# Patient Record
Sex: MALE | Race: WHITE | Employment: OTHER | URBAN - METROPOLITAN AREA
[De-identification: names, ages, dates, MRNs, and addresses within clinical notes are randomized per-mention and may not be internally consistent; named-entity substitution may affect disease eponyms.]

---

## 2022-05-17 ENCOUNTER — HOSPITAL ENCOUNTER (OUTPATIENT)
Age: 37
Setting detail: OBSERVATION
Discharge: HOME | End: 2022-05-18
Attending: EMERGENCY MEDICINE | Admitting: SURGERY
Payer: MEDICAID

## 2022-05-17 ENCOUNTER — APPOINTMENT (OUTPATIENT)
Dept: CT IMAGING | Age: 37
End: 2022-05-17
Payer: MEDICAID

## 2022-05-17 DIAGNOSIS — K61.0 PERIANAL ABSCESS: Primary | ICD-10-CM

## 2022-05-17 LAB
ANION GAP SERPL CALCULATED.3IONS-SCNC: 14 MMOL/L (ref 3–16)
BASOPHILS ABSOLUTE: 0.1 K/UL (ref 0–0.2)
BASOPHILS RELATIVE PERCENT: 0.7 %
BUN BLDV-MCNC: 9 MG/DL (ref 7–20)
CALCIUM SERPL-MCNC: 9.3 MG/DL (ref 8.3–10.6)
CHLORIDE BLD-SCNC: 105 MMOL/L (ref 99–110)
CO2: 20 MMOL/L (ref 21–32)
CREAT SERPL-MCNC: 0.7 MG/DL (ref 0.9–1.3)
EOSINOPHILS ABSOLUTE: 1.7 K/UL (ref 0–0.6)
EOSINOPHILS RELATIVE PERCENT: 11.7 %
GFR AFRICAN AMERICAN: >60
GFR NON-AFRICAN AMERICAN: >60
GLUCOSE BLD-MCNC: 101 MG/DL (ref 70–99)
HCT VFR BLD CALC: 42.7 % (ref 40.5–52.5)
HEMOGLOBIN: 14.6 G/DL (ref 13.5–17.5)
LACTIC ACID: 0.8 MMOL/L (ref 0.4–2)
LYMPHOCYTES ABSOLUTE: 2.1 K/UL (ref 1–5.1)
LYMPHOCYTES RELATIVE PERCENT: 14.6 %
MCH RBC QN AUTO: 31.4 PG (ref 26–34)
MCHC RBC AUTO-ENTMCNC: 34.3 G/DL (ref 31–36)
MCV RBC AUTO: 91.7 FL (ref 80–100)
MONOCYTES ABSOLUTE: 1 K/UL (ref 0–1.3)
MONOCYTES RELATIVE PERCENT: 7.2 %
NEUTROPHILS ABSOLUTE: 9.6 K/UL (ref 1.7–7.7)
NEUTROPHILS RELATIVE PERCENT: 65.8 %
PDW BLD-RTO: 12.9 % (ref 12.4–15.4)
PLATELET # BLD: 226 K/UL (ref 135–450)
PMV BLD AUTO: 9.2 FL (ref 5–10.5)
POTASSIUM REFLEX MAGNESIUM: 4.5 MMOL/L (ref 3.5–5.1)
RBC # BLD: 4.65 M/UL (ref 4.2–5.9)
SODIUM BLD-SCNC: 139 MMOL/L (ref 136–145)
WBC # BLD: 14.6 K/UL (ref 4–11)

## 2022-05-17 PROCEDURE — 96365 THER/PROPH/DIAG IV INF INIT: CPT

## 2022-05-17 PROCEDURE — 87205 SMEAR GRAM STAIN: CPT

## 2022-05-17 PROCEDURE — 2580000003 HC RX 258: Performed by: SURGERY

## 2022-05-17 PROCEDURE — 83605 ASSAY OF LACTIC ACID: CPT

## 2022-05-17 PROCEDURE — 96372 THER/PROPH/DIAG INJ SC/IM: CPT

## 2022-05-17 PROCEDURE — 87070 CULTURE OTHR SPECIMN AEROBIC: CPT

## 2022-05-17 PROCEDURE — 87040 BLOOD CULTURE FOR BACTERIA: CPT

## 2022-05-17 PROCEDURE — 99219 PR INITIAL OBSERVATION CARE/DAY 50 MINUTES: CPT | Performed by: SURGERY

## 2022-05-17 PROCEDURE — 6360000002 HC RX W HCPCS: Performed by: SURGERY

## 2022-05-17 PROCEDURE — G0378 HOSPITAL OBSERVATION PER HR: HCPCS

## 2022-05-17 PROCEDURE — 96367 TX/PROPH/DG ADDL SEQ IV INF: CPT

## 2022-05-17 PROCEDURE — 85025 COMPLETE CBC W/AUTO DIFF WBC: CPT

## 2022-05-17 PROCEDURE — 99285 EMERGENCY DEPT VISIT HI MDM: CPT

## 2022-05-17 PROCEDURE — 2580000003 HC RX 258: Performed by: EMERGENCY MEDICINE

## 2022-05-17 PROCEDURE — 6360000004 HC RX CONTRAST MEDICATION: Performed by: EMERGENCY MEDICINE

## 2022-05-17 PROCEDURE — 80048 BASIC METABOLIC PNL TOTAL CA: CPT

## 2022-05-17 PROCEDURE — 74177 CT ABD & PELVIS W/CONTRAST: CPT

## 2022-05-17 PROCEDURE — 36415 COLL VENOUS BLD VENIPUNCTURE: CPT

## 2022-05-17 PROCEDURE — 87075 CULTR BACTERIA EXCEPT BLOOD: CPT

## 2022-05-17 PROCEDURE — 87186 SC STD MICRODIL/AGAR DIL: CPT

## 2022-05-17 PROCEDURE — 2500000003 HC RX 250 WO HCPCS: Performed by: SURGERY

## 2022-05-17 PROCEDURE — 96366 THER/PROPH/DIAG IV INF ADDON: CPT

## 2022-05-17 PROCEDURE — 87076 CULTURE ANAEROBE IDENT EACH: CPT

## 2022-05-17 PROCEDURE — 96368 THER/DIAG CONCURRENT INF: CPT

## 2022-05-17 PROCEDURE — 6360000002 HC RX W HCPCS: Performed by: EMERGENCY MEDICINE

## 2022-05-17 PROCEDURE — 87077 CULTURE AEROBIC IDENTIFY: CPT

## 2022-05-17 PROCEDURE — 96361 HYDRATE IV INFUSION ADD-ON: CPT

## 2022-05-17 PROCEDURE — 2500000003 HC RX 250 WO HCPCS: Performed by: PHYSICIAN ASSISTANT

## 2022-05-17 RX ORDER — MORPHINE SULFATE 2 MG/ML
2 INJECTION, SOLUTION INTRAMUSCULAR; INTRAVENOUS
Status: DISCONTINUED | OUTPATIENT
Start: 2022-05-17 | End: 2022-05-18 | Stop reason: HOSPADM

## 2022-05-17 RX ORDER — ONDANSETRON 2 MG/ML
4 INJECTION INTRAMUSCULAR; INTRAVENOUS EVERY 6 HOURS PRN
Status: DISCONTINUED | OUTPATIENT
Start: 2022-05-17 | End: 2022-05-18 | Stop reason: HOSPADM

## 2022-05-17 RX ORDER — OXYCODONE HYDROCHLORIDE 5 MG/1
5 TABLET ORAL EVERY 4 HOURS PRN
Status: DISCONTINUED | OUTPATIENT
Start: 2022-05-17 | End: 2022-05-18 | Stop reason: HOSPADM

## 2022-05-17 RX ORDER — POTASSIUM CHLORIDE 7.45 MG/ML
10 INJECTION INTRAVENOUS PRN
Status: DISCONTINUED | OUTPATIENT
Start: 2022-05-17 | End: 2022-05-18 | Stop reason: HOSPADM

## 2022-05-17 RX ORDER — METRONIDAZOLE 500 MG/100ML
500 INJECTION, SOLUTION INTRAVENOUS EVERY 8 HOURS
Status: DISCONTINUED | OUTPATIENT
Start: 2022-05-17 | End: 2022-05-18 | Stop reason: HOSPADM

## 2022-05-17 RX ORDER — OXYCODONE HYDROCHLORIDE 10 MG/1
10 TABLET ORAL EVERY 4 HOURS PRN
Status: DISCONTINUED | OUTPATIENT
Start: 2022-05-17 | End: 2022-05-18 | Stop reason: HOSPADM

## 2022-05-17 RX ORDER — MORPHINE SULFATE 4 MG/ML
4 INJECTION, SOLUTION INTRAMUSCULAR; INTRAVENOUS
Status: DISCONTINUED | OUTPATIENT
Start: 2022-05-17 | End: 2022-05-18 | Stop reason: HOSPADM

## 2022-05-17 RX ORDER — DEXTROSE, SODIUM CHLORIDE, AND POTASSIUM CHLORIDE 5; .45; .15 G/100ML; G/100ML; G/100ML
INJECTION INTRAVENOUS CONTINUOUS
Status: DISCONTINUED | OUTPATIENT
Start: 2022-05-17 | End: 2022-05-18 | Stop reason: HOSPADM

## 2022-05-17 RX ORDER — SODIUM CHLORIDE 0.9 % (FLUSH) 0.9 %
5-40 SYRINGE (ML) INJECTION PRN
Status: DISCONTINUED | OUTPATIENT
Start: 2022-05-17 | End: 2022-05-18 | Stop reason: HOSPADM

## 2022-05-17 RX ORDER — SODIUM CHLORIDE 9 MG/ML
INJECTION, SOLUTION INTRAVENOUS PRN
Status: DISCONTINUED | OUTPATIENT
Start: 2022-05-17 | End: 2022-05-18 | Stop reason: HOSPADM

## 2022-05-17 RX ORDER — ONDANSETRON 4 MG/1
4 TABLET, ORALLY DISINTEGRATING ORAL EVERY 8 HOURS PRN
Status: DISCONTINUED | OUTPATIENT
Start: 2022-05-17 | End: 2022-05-18 | Stop reason: HOSPADM

## 2022-05-17 RX ORDER — MAGNESIUM SULFATE IN WATER 40 MG/ML
2000 INJECTION, SOLUTION INTRAVENOUS PRN
Status: DISCONTINUED | OUTPATIENT
Start: 2022-05-17 | End: 2022-05-18 | Stop reason: HOSPADM

## 2022-05-17 RX ORDER — ACETAMINOPHEN 325 MG/1
650 TABLET ORAL EVERY 4 HOURS PRN
Status: DISCONTINUED | OUTPATIENT
Start: 2022-05-17 | End: 2022-05-18 | Stop reason: HOSPADM

## 2022-05-17 RX ORDER — 0.9 % SODIUM CHLORIDE 0.9 %
1000 INTRAVENOUS SOLUTION INTRAVENOUS ONCE
Status: COMPLETED | OUTPATIENT
Start: 2022-05-17 | End: 2022-05-17

## 2022-05-17 RX ORDER — CIPROFLOXACIN 2 MG/ML
400 INJECTION, SOLUTION INTRAVENOUS EVERY 12 HOURS
Status: DISCONTINUED | OUTPATIENT
Start: 2022-05-17 | End: 2022-05-18 | Stop reason: HOSPADM

## 2022-05-17 RX ORDER — ENOXAPARIN SODIUM 100 MG/ML
40 INJECTION SUBCUTANEOUS DAILY
Status: DISCONTINUED | OUTPATIENT
Start: 2022-05-17 | End: 2022-05-18 | Stop reason: HOSPADM

## 2022-05-17 RX ORDER — SODIUM CHLORIDE 0.9 % (FLUSH) 0.9 %
5-40 SYRINGE (ML) INJECTION EVERY 12 HOURS SCHEDULED
Status: DISCONTINUED | OUTPATIENT
Start: 2022-05-17 | End: 2022-05-18 | Stop reason: HOSPADM

## 2022-05-17 RX ORDER — LIDOCAINE HYDROCHLORIDE AND EPINEPHRINE 10; 10 MG/ML; UG/ML
20 INJECTION, SOLUTION INFILTRATION; PERINEURAL ONCE
Status: COMPLETED | OUTPATIENT
Start: 2022-05-17 | End: 2022-05-17

## 2022-05-17 RX ADMIN — IOPAMIDOL 100 ML: 755 INJECTION, SOLUTION INTRAVENOUS at 11:45

## 2022-05-17 RX ADMIN — LIDOCAINE HYDROCHLORIDE,EPINEPHRINE BITARTRATE 20 ML: 10; .01 INJECTION, SOLUTION INFILTRATION; PERINEURAL at 18:58

## 2022-05-17 RX ADMIN — SODIUM CHLORIDE 1000 ML: 9 INJECTION, SOLUTION INTRAVENOUS at 10:40

## 2022-05-17 RX ADMIN — CIPROFLOXACIN 400 MG: 2 INJECTION, SOLUTION INTRAVENOUS at 21:46

## 2022-05-17 RX ADMIN — Medication 10 ML: at 20:21

## 2022-05-17 RX ADMIN — POTASSIUM CHLORIDE, DEXTROSE MONOHYDRATE AND SODIUM CHLORIDE: 150; 5; 450 INJECTION, SOLUTION INTRAVENOUS at 20:19

## 2022-05-17 RX ADMIN — ENOXAPARIN SODIUM 40 MG: 100 INJECTION SUBCUTANEOUS at 20:20

## 2022-05-17 RX ADMIN — SODIUM CHLORIDE 3000 MG: 9 INJECTION, SOLUTION INTRAVENOUS at 13:23

## 2022-05-17 RX ADMIN — METRONIDAZOLE 500 MG: 500 INJECTION, SOLUTION INTRAVENOUS at 20:20

## 2022-05-17 ASSESSMENT — PAIN - FUNCTIONAL ASSESSMENT: PAIN_FUNCTIONAL_ASSESSMENT: ACTIVITIES ARE NOT PREVENTED

## 2022-05-17 ASSESSMENT — ENCOUNTER SYMPTOMS
COUGH: 0
CHEST TIGHTNESS: 0
ABDOMINAL PAIN: 0
NAUSEA: 0
SORE THROAT: 0
SHORTNESS OF BREATH: 0
EYES NEGATIVE: 1
DIARRHEA: 0
RECTAL PAIN: 1
VOMITING: 0

## 2022-05-17 ASSESSMENT — PAIN DESCRIPTION - DESCRIPTORS: DESCRIPTORS: ACHING;PRESSURE

## 2022-05-17 ASSESSMENT — PAIN SCALES - GENERAL
PAINLEVEL_OUTOF10: 0
PAINLEVEL_OUTOF10: 3
PAINLEVEL_OUTOF10: 0

## 2022-05-17 ASSESSMENT — PAIN DESCRIPTION - LOCATION: LOCATION: OTHER (COMMENT)

## 2022-05-17 ASSESSMENT — PAIN DESCRIPTION - ORIENTATION: ORIENTATION: RIGHT

## 2022-05-17 NOTE — ED NOTES
[id number] placed IV easily right antecubital.    As soon as IV placed, pt states \"I am feeling very hot\". Blankets removed. Diaphoretic. VS taken. Didn't pass out. HOB lowered. EMD updated and IV fluids of NS began wide open.      Primitivo Coelho RN  05/17/22 6664

## 2022-05-17 NOTE — ED NOTES
[id number] Explained new orders and placing IV/drawing labs. IV site started to infiltrate but labs drawn before this happened.   Removed intact     Calos Pritchett RN  05/17/22 8535

## 2022-05-17 NOTE — ED NOTES
Report called to [address] at [facility].     Pt now leaving on ambulance stretcher to go to Abdulaziz Pimentel, RN  05/17/22 [phone]

## 2022-05-17 NOTE — ED NOTES
1412-14 on hold for 14+ minutes to reach access center. No bed assigned yet at [address] sup being called by access center. [facility] is full. Once a pt is discharged , then pt will be assigned a room.    Once room is cleaned, then pt can be transported to Brittany Campos RN  05/17/22 4446

## 2022-05-17 NOTE — ED NOTES
Right peroneal/buttocks abscess intermittently for past 4 months-occasionally drains. became more painful (with some bleeding also noted) yesterday. smaller than golf ball sized abscess noted-currently not draining or  bleeding.   pain now at 6-7.  no OTC pain meds taken     Meli Vaughn RN  05/17/22 1100

## 2022-05-17 NOTE — ED NOTES
EMD already called [facility] center for consult to general surgery for admission to [facility]/possible surgery     Priti Morales RN  05/17/22 5203

## 2022-05-17 NOTE — ED PROVIDER NOTES
[id number] [facility]  [facility]      Pt Name: Richelle Sweeney  MRN: [id number]  [place] 1985  Date ofevaluation: 5/17/2022  Provider: Ninoska Garza MD    CHIEF COMPLAINT       Chief Complaint   Patient presents with    Abscess     Right peroneal/buttocks abscess intermittently for past 4 months-occasionally drains. became more painful (with some bleeding also noted) yesterday. smaller than golf ball sized abscess noted-currently not draining or  bleeding. pain now at 6-7.  no OTC pain meds taken         HISTORY OF PRESENT ILLNESS   (Location/Symptom, Timing/Onset,Context/Setting, Quality, Duration, Modifying Factors, Severity)  Note limiting factors. Richelle Sweeney is a 39 y.o. male  who  has no past medical history on file. who presents to the emergency department     58-year-old male who presents for right perineal buttock pain and swelling which has been present intermittently for the last 4 months gradual in onset now significantly worse for the last 24 hours with pain and some bleeding when he was wiping. No active drainage. Pain is significantly worse achy and throbbing in nature. Nonradiating. Worse with having a bowel movement. No other modifying factors. Not taking any other medications at home. No other associated symptoms. No fevers or chills. No history of recurrent infections or immunocompromise state. For further review of systems see below. Symptoms are moderate to severe. The history is provided by the patient. No  was used. NursingNotes were reviewed. REVIEW OF SYSTEMS    (2-9 systems for level 4, 10 or more for level 5)     Review of Systems   Constitutional: Negative. Negative for fatigue and fever. HENT: Negative for congestion and sore throat. Eyes: Negative. Respiratory: Negative for cough, chest tightness and shortness of breath. Cardiovascular: Negative for chest pain. Gastrointestinal: Positive for rectal pain. Negative for abdominal pain, diarrhea, nausea and vomiting. Genitourinary: Negative. Musculoskeletal: Negative. Skin: Negative. Cyst/Abscess   Neurological: Negative for dizziness, light-headedness and headaches. All other systems reviewed and are negative. Except as noted above the remainder of the review of systems was reviewed and negative. PAST MEDICAL HISTORY   History reviewed. No pertinent past medical history. SURGICALHISTORY     History reviewed. No pertinent surgical history. CURRENT MEDICATIONS       Previous Medications    No medications on file            Patient has no known allergies. FAMILY HISTORY     History reviewed. No pertinent family history. SOCIAL HISTORY       Social History     Socioeconomic History    Marital status:      Spouse name: None    Number of children: None    Years of education: None    Highest education level: None   Occupational History    None   Tobacco Use    Smoking status: Current Every Day Smoker     Packs/day: 0.50     Types: Cigarettes    Smokeless tobacco: Never Used   Substance and Sexual Activity    Alcohol use: Yes    Drug use: Yes     Comment: claims \"the good ones\"     Sexual activity: None   Other Topics Concern    None   Social History Narrative    None     Social Determinants of Health     Financial Resource Strain:     Difficulty of Paying Living Expenses: Not on file   Food Insecurity:     Worried About Running Out of Food in the Last Year: Not on file    Horace of Food in the Last Year: Not on file   Transportation Needs:     Lack of Transportation (Medical): Not on file    Lack of Transportation (Non-Medical):  Not on file   Physical Activity:     Days of Exercise per Week: Not on file    Minutes of Exercise per Session: Not on file   Stress:     Feeling of Stress : Not on file   Social Connections:     Frequency of Communication with Friends and Family: Not on file    Frequency of Social Gatherings with Friends and Family: Not on file    Attends Episcopal Services: Not on file    Active Member of Clubs or Organizations: Not on file    Attends Club or Organization Meetings: Not on file    Marital Status: Not on file   Intimate Partner Violence:     Fear of Current or Ex-Partner: Not on file    Emotionally Abused: Not on file    Physically Abused: Not on file    Sexually Abused: Not on file   Housing Stability:     Unable to Pay for Housing in the Last Year: Not on file    Number of [place] in the Last Year: Not on file    Unstable Housing in the Last Year: Not on file       SCREENINGS    [place] Coma Scale  Eye Opening: Spontaneous  Best Verbal Response: Oriented  Best Motor Response: Obeys commands  [place] Coma Scale Score: 15        PHYSICAL EXAM    (up to 7 for level 4, 8 or more for level 5)     ED Triage Vitals   BP Temp Temp src Pulse Resp SpO2 Height Weight   -- -- -- -- -- -- -- --       Physical Exam  Vitals and nursing note reviewed. Exam conducted with a chaperone present. Constitutional:       General: He is not in acute distress. Appearance: Normal appearance. He is well-developed and normal weight. He is not ill-appearing, toxic-appearing or diaphoretic. HENT:      Head: Normocephalic and atraumatic. Mouth/Throat:      Mouth: Mucous membranes are moist.      Pharynx: Oropharynx is clear. Eyes:      Extraocular Movements: Extraocular movements intact. Cardiovascular:      Rate and Rhythm: Normal rate and regular rhythm. Pulses: Normal pulses. Pulmonary:      Effort: Pulmonary effort is normal.      Breath sounds: Normal breath sounds. No decreased breath sounds. Abdominal:      Palpations: Abdomen is soft. Tenderness: There is no abdominal tenderness. Genitourinary:     Rectum: Tenderness present.           Comments: Significant rectal tenderness with large swelling and surrounding erythema on the right anal opening with some surrounding confluent erythema  Musculoskeletal:      Cervical back: Normal range of motion and neck supple. Skin:     General: Skin is warm and dry. Capillary Refill: Capillary refill takes less than 2 seconds. Neurological:      General: No focal deficit present. Mental Status: He is alert. Psychiatric:         Mood and Affect: Mood normal.         RESULTS         RADIOLOGY:   Non-plain filmimages such as CT, Ultrasound and MRI are read by the radiologist.   Interpretation per the Radiologist below, if available at the time ofthis note:    CT ABDOMEN PELVIS W IV CONTRAST Additional Contrast? None   Final Result   Findings worrisome for a mass in segment 6 of the liver. MRI of the liver   using liver mass protocol is recommended for further evaluation. Perianal abscess measuring 3.0 x 2.7 x 2.4 cm in size. Question of a filling defect in the proximal portion of the portal vein on   images 57-63 versus inflow of unopacified blood. Vascular ultrasound is   recommended for further evaluation. ED BEDSIDE ULTRASOUND:   Performed by ED Physician - none    LABS:  Labs Reviewed   CBC WITH AUTO DIFFERENTIAL - Abnormal; Notable for the following components:       Result Value    WBC 14.6 (*)     Neutrophils Absolute 9.6 (*)     Eosinophils Absolute 1.7 (*)     All other components within normal limits   BASIC METABOLIC PANEL W/ REFLEX TO MG FOR LOW K - Abnormal; Notable for the following components:    CO2 20 (*)     Glucose 101 (*)     CREATININE 0.7 (*)     All other components within normal limits   CULTURE, BLOOD 1   CULTURE, BLOOD 2   LACTIC ACID       All other labs were within normal range or not returned as of this dictation.     EMERGENCY DEPARTMENT COURSE and DIFFERENTIAL DIAGNOSIS/MDM:   Vitals:    Vitals:    05/17/22 1100 05/17/22 1115 05/17/22 1130 05/17/22 1202   BP: 128/71 119/77 121/74 127/74   Pulse: 56 57 60 55   Resp: 18 13 22 20   Temp:       TempSrc:       SpO2: 100% 100% 100% 100%   Weight:       Height:           Patient was given thefollowing medications:  Medications   ampicillin-sulbactam (UNASYN) 3000 mg ivpb minibag (has no administration in time range)   0.9 % sodium chloride bolus (0 mLs IntraVENous Stopped 5/17/22 1205)   iopamidol (ISOVUE-370) 76 % injection 100 mL (100 mLs IntraVENous Given 5/17/22 1145)       ED COURSE & MEDICAL DECISION MAKING    Pertinent Labs & Imaging studies reviewed. (See chart for details)   -  Patient seen and evaluated in the emergency department. -  Triage and nursing notes reviewed and incorporated. -  Old chart records reviewed and incorporated. -  Differential diagnosis includes: Differential diagnosis: Perineal abscess, perianal abscess, interest enteric abscess, necrotizing fasciitis, deep space soft tissue bacterial skin infection, viral rash, systemic infectious rash, aseptic cyst, malignancy, other    25-year-old male presents for likely right perineal perirectal abscess with significant tenderness on his rectal exam no active bleeding no active drainage. Significant fluctuance and induration. Concern for deep space infection is high. Patient is afebrile not tachycardic saturating well on room air normotensive. Will obtain broad infectious work-up as well as CT scan of abdomen and pelvis with contrast to look for deep space infection we will also give patient dose of Unasyn as well as order lactate and cultures. Will reeval closely and likely consult general surgery for possible OR drainage.    -  Work-up included:  See above  -  ED treatment included: See above  -  Results discussed with patient. REASSESSMENT     ED Course as of 05/17/22 1319   Tue May 17, 2022   1208 CT scan with significant perianal and perirectal abscess. Elevated WBC.  Will give IV abx and consult General Surgery for admission and possible OR drainage. [SC]   1318 Accepted for transfer by  Shan [SC]      ED Course User Index  [SC] Dustin Rashid MD         CRITICAL CARE TIME   Total Critical Care time was 21 minutes, excluding separately reportable procedures. There was a high probability of clinically significant/life threatening deterioration in the patient's condition which required my urgent intervention. This includes multiple reevaluations, vital sign monitoring, pulse oximetry monitoring, telemetry monitoring, clinical response to the IV medications, reviewing the nursing notes, consultation time, dictation/documentation time, and interpretation of the labwork. (This time excludes time spent performing procedures). CONSULTS:  IP CONSULT TO GENERAL SURGERY    PROCEDURES:  Unless otherwise noted below, none     Procedures    FINAL IMPRESSION      1. Perianal abscess          DISPOSITION/PLAN   DISPOSITION        PATIENT REFERREDTO:  No follow-up provider specified.     DISCHARGEMEDICATIONS:  New Prescriptions    No medications on file          (Please note that portions of this note were completed with a voice recognition program.  Efforts were made to edit the dictations but occasionally words are mis-transcribed.)    Dustin Rashid MD (electronically signed)  Attending Emergency Physician         Dustin Rashid MD  05/17/22 9141

## 2022-05-17 NOTE — ED NOTES
Pt assigned room 4252 on tracking board-room being cleaned. Pt updated.           Leoncio Moore RN  05/17/22 9991

## 2022-05-17 NOTE — ED NOTES
Access center called. .   Pt will be admitted to room 4252 at [address].   Phone number for report is [phone]. [facility] will transport at 1730. Pt updated. No pain.   Dad at bedside and updated (with pt permission)     Cornland Metro, RN  05/17/22 327 [facility], RN  05/17/22 2775

## 2022-05-17 NOTE — PROGRESS NOTES
Call light and personal belongings within reach, room free of clutter. Pt with no s/s of distress or SOB observed. Pt in facility via stretcher able to get up and ambulate to bed. PA in to do opening of perirectal abscess. Procedure was tolerated well. Dry dressing applied per MD. Pt declined dinner. Is alert and oriented x4 and able to make needs known. No c/o pain at this time.

## 2022-05-17 NOTE — ED NOTES
[phone] report to [facility] crew who will be transporting pt to [address] [place] [address],[address].  Pain to tab rectal area at 2. Remains in sinus rhythm. Belongings with pt. Dad still at bedside. To [facility] on ambulance stretcher.         Jeffrey Arita, FREDO  05/17/22 1300

## 2022-05-18 VITALS
SYSTOLIC BLOOD PRESSURE: 110 MMHG | BODY MASS INDEX: 20.94 KG/M2 | HEART RATE: 61 BPM | RESPIRATION RATE: 18 BRPM | TEMPERATURE: 97.9 F | HEIGHT: 66 IN | WEIGHT: 130.29 LBS | OXYGEN SATURATION: 95 % | DIASTOLIC BLOOD PRESSURE: 57 MMHG

## 2022-05-18 LAB
ANION GAP SERPL CALCULATED.3IONS-SCNC: 10 MMOL/L (ref 3–16)
BASOPHILS ABSOLUTE: 0.1 K/UL (ref 0–0.2)
BASOPHILS RELATIVE PERCENT: 0.6 %
BUN BLDV-MCNC: 8 MG/DL (ref 7–20)
CALCIUM SERPL-MCNC: 9 MG/DL (ref 8.3–10.6)
CHLORIDE BLD-SCNC: 106 MMOL/L (ref 99–110)
CO2: 23 MMOL/L (ref 21–32)
CREAT SERPL-MCNC: 0.7 MG/DL (ref 0.9–1.3)
EOSINOPHILS ABSOLUTE: 1.7 K/UL (ref 0–0.6)
EOSINOPHILS RELATIVE PERCENT: 17.9 %
GFR AFRICAN AMERICAN: >60
GFR NON-AFRICAN AMERICAN: >60
GLUCOSE BLD-MCNC: 118 MG/DL (ref 70–99)
HCT VFR BLD CALC: 43 % (ref 40.5–52.5)
HEMOGLOBIN: 14.7 G/DL (ref 13.5–17.5)
LYMPHOCYTES ABSOLUTE: 2.2 K/UL (ref 1–5.1)
LYMPHOCYTES RELATIVE PERCENT: 22.8 %
MAGNESIUM: 2.1 MG/DL (ref 1.8–2.4)
MCH RBC QN AUTO: 31.6 PG (ref 26–34)
MCHC RBC AUTO-ENTMCNC: 34.2 G/DL (ref 31–36)
MCV RBC AUTO: 92.3 FL (ref 80–100)
MONOCYTES ABSOLUTE: 0.9 K/UL (ref 0–1.3)
MONOCYTES RELATIVE PERCENT: 8.9 %
NEUTROPHILS ABSOLUTE: 4.8 K/UL (ref 1.7–7.7)
NEUTROPHILS RELATIVE PERCENT: 49.8 %
PDW BLD-RTO: 12.9 % (ref 12.4–15.4)
PHOSPHORUS: 3.2 MG/DL (ref 2.5–4.9)
PLATELET # BLD: 214 K/UL (ref 135–450)
PMV BLD AUTO: 8.8 FL (ref 5–10.5)
POTASSIUM REFLEX MAGNESIUM: 4.6 MMOL/L (ref 3.5–5.1)
RBC # BLD: 4.65 M/UL (ref 4.2–5.9)
SODIUM BLD-SCNC: 139 MMOL/L (ref 136–145)
WBC # BLD: 9.6 K/UL (ref 4–11)

## 2022-05-18 PROCEDURE — 46050 I&D PERIANAL ABSCESS SUPFC: CPT | Performed by: PHYSICIAN ASSISTANT

## 2022-05-18 PROCEDURE — G0378 HOSPITAL OBSERVATION PER HR: HCPCS

## 2022-05-18 PROCEDURE — 2500000003 HC RX 250 WO HCPCS: Performed by: SURGERY

## 2022-05-18 PROCEDURE — 84100 ASSAY OF PHOSPHORUS: CPT

## 2022-05-18 PROCEDURE — 99024 POSTOP FOLLOW-UP VISIT: CPT | Performed by: PHYSICIAN ASSISTANT

## 2022-05-18 PROCEDURE — 36415 COLL VENOUS BLD VENIPUNCTURE: CPT

## 2022-05-18 PROCEDURE — 96366 THER/PROPH/DIAG IV INF ADDON: CPT

## 2022-05-18 PROCEDURE — 85025 COMPLETE CBC W/AUTO DIFF WBC: CPT

## 2022-05-18 PROCEDURE — 83735 ASSAY OF MAGNESIUM: CPT

## 2022-05-18 PROCEDURE — 80048 BASIC METABOLIC PNL TOTAL CA: CPT

## 2022-05-18 RX ORDER — CIPROFLOXACIN 500 MG/1
500 TABLET, FILM COATED ORAL 2 TIMES DAILY
Qty: 20 TABLET | Refills: 0 | Status: SHIPPED | OUTPATIENT
Start: 2022-05-18 | End: 2022-05-28

## 2022-05-18 RX ADMIN — METRONIDAZOLE 500 MG: 500 INJECTION, SOLUTION INTRAVENOUS at 03:39

## 2022-05-18 NOTE — PROGRESS NOTES
Packing removed per [facility]I Wilson order. Pt educated on d/c instructions, abx and follow up appointment.  Electronically signed by Gaston Wallis RN on 5/18/2022 at 10:05 AM

## 2022-05-18 NOTE — PROGRESS NOTES
Pt educated on discharge instructions and follow-up appointments. Pt states no further questions at this time. Pt IV removed with no complications. Pt ambulated to ED lobby, transporting self home.     Electronically signed by Gerardo Ramirez RN on 5/18/22 at 8:07 AM EDT

## 2022-05-18 NOTE — H&P
General Surgery History & Physical      Faby Marcelino   : 1985 MRN: [id number]  Date of Admission: 2022  Admitting Ang Peraza MD  Primary Care Physician: No primary care provider on file. Chief Complaint   Patient presents with    Abscess     Right peroneal/buttocks abscess intermittently for past 4 months-occasionally drains. became more painful (with some bleeding also noted) yesterday. smaller than golf ball sized abscess noted-currently not draining or  bleeding. pain now at 6-7.  no OTC pain meds taken       Chief Complaint/Reason for consultation: \"perianal abscess\"    Diagnosis Present on Admission:   Perianal abscess      Secondary Diagnosis  Patient Active Problem List   Diagnosis    Perianal abscess       History of Present Illness  Faby Marcelino is a 39 y.o. male who presented to the ED today for a perianal abscess. He first noticed swelling and possible cyst located on the right buttock/perianal area over the past few months. Typically he has bee able to express the fluid collection manually. However this time, the swelling worsened and went deeper. No fevers or chills. He did have some blood in his stool yesterday. Never had anything like this before. History reviewed. No pertinent past medical history. History reviewed. No pertinent surgical history. Family history reviewed and otherwise negative. History reviewed. No pertinent family history. Social History     Socioeconomic History    Marital status:      Spouse name: Not on file    Number of children: Not on file    Years of education: Not on file    Highest education level: Not on file   Occupational History    Not on file   Tobacco Use    Smoking status: Current Every Day Smoker     Packs/day: 0.50     Types: Cigarettes    Smokeless tobacco: Never Used   Substance and Sexual Activity    Alcohol use:  Yes    Drug use: Yes     Comment: claims \"the good ones\"     Sexual activity: Not on file   Other Topics Concern    Not on file   Social History Narrative    Not on file     Social Determinants of Health     Financial Resource Strain:     Difficulty of Paying Living Expenses: Not on file   Food Insecurity:     Worried About Running Out of Food in the Last Year: Not on file    Horace of Food in the Last Year: Not on file   Transportation Needs:     Lack of Transportation (Medical): Not on file    Lack of Transportation (Non-Medical): Not on file   Physical Activity:     Days of Exercise per Week: Not on file    Minutes of Exercise per Session: Not on file   Stress:     Feeling of Stress : Not on file   Social Connections:     Frequency of Communication with Friends and Family: Not on file    Frequency of Social Gatherings with Friends and Family: Not on file    Attends Yarsanism Services: Not on file    Active Member of [address] [place] [address] [employer] or Organizations: Not on file    Attends Club or Organization Meetings: Not on file    Marital Status: Not on file   Intimate Partner Violence:     Fear of Current or Ex-Partner: Not on file    Emotionally Abused: Not on file    Physically Abused: Not on file    Sexually Abused: Not on file   Housing Stability:     Unable to Pay for Housing in the Last Year: Not on file    Number of [place] in the Last Year: Not on file    Unstable Housing in the Last Year: Not on file     No Known Allergies  Prior to Admission medications    Not on File       Review of Systems  Pertinent positives and negatives are in HPI, otherwise all systems reviewed and negative    Physical Exam  Vitals:    05/17/22 2009   BP: 112/72   Pulse: 76   Resp: 18   Temp: 98 °F (36.7 °C)   SpO2: 97%       General/Appearance: NAD, appears stated age  HEENT: PERRLA, Conjunctiva non injected, no scleral icterus. Mucous membranes pink and moist.  Neck is symmetrical. Trachea appears midline.   Lung: normal respiratory effort, no accessory muscle use  Cardiac: Regular rate and rhythm  Abdomen: soft, NT, ND  Neuro: No gross motor or sensory deficits. Skin: right perianal area with wound with packing, no bleeding or drainage, minimal tenderness and induration surrouning    Labs  Recent Labs     05/17/22  1030   WBC 14.6*   HGB 14.6   HCT 42.7        Recent Labs     05/17/22  1030      K 4.5      CO2 20*   BUN 9   GFRAA >60     No results for input(s): TP, ALB, GLOB, AST, ALT in the last 72 hours. Invalid input(s): DBIL, TBIL, AGRAT, GPT, MOUNIKA, LIP  No results for input(s): UOSM in the last 72 hours. Invalid input(s): UAPR, [facility], [facility], [place], [facility], [place], [place], [facility]    Imaging  CT ABDOMEN PELVIS W IV CONTRAST Additional Contrast? None   Final Result   Findings worrisome for a mass in segment 6 of the liver. MRI of the liver   using liver mass protocol is recommended for further evaluation. Perianal abscess measuring 3.0 x 2.7 x 2.4 cm in size. Question of a filling defect in the proximal portion of the portal vein on   images 57-63 versus inflow of unopacified blood. Vascular ultrasound is   recommended for further evaluation. CT abd/pelvis personally reviewed, results as above    Assessment  Aram Hatchet is a 39 y.o. male admitted for perianal abscess    Plan    1. Perianal abscess  · CT abd/pelvis shows 2.4 x 2.7 x 3 cm perianal abscess  · I&D performed at bedside by Benito Rodriguez PA-C. Please see his note for further details  · Continue cipro/flagyl  · Ok for regular diet, NPO after midnight in case he clinically worsens and needs OR drainage/debridement  · If wound improving, will remove packing and discharge home tomorrow  2.  Incidental right liver mass/cyst/abscess  · Will discuss with radiology next best imaging study given liver masses and questionable filling defect of portal vein  · Plan to obtain imaging as an outpatient due to childcare issues requiring attention at home      Carlotta Delgado MD

## 2022-05-18 NOTE — PROCEDURES
Procedure Note: Incision and Drainage of Perirectal Abscess    Indication: Indurated and erythematous area located in the perirectal region which is fluctuant with probable pus    Time Out: A time-out was completed verifying consent was obtained, correct patient, procedure, site, positioning, and correct supplies were present. Procedure: Patient was positioned, prepped in usual sterile fashion. Approximately 5 ml's of 1% Lidocaine was used to anesthetize the area. A number 15 blade scalpel was used to make an approximately 3 cm incision horizontally across the body of the abscess. A forcep and manual pressure was used to remove the contents of the abscess cavity, and a culture was obtained    Following evacuation of the abscess cavity, it was rinsed with sterile normal saline. Finally, the abscess cavity was packed with 1/4 inch iodoform packing and dressed with 4x4 guaze and ABD pad. EBL: < 5 ml    The patient tolerated the procedure well.  No complications    Electronically signed by Muriel Peralta PA-C on 5/18/2022 at 1:35 PM

## 2022-05-18 NOTE — PROGRESS NOTES
Pt awake and walking around room, stating he wants to leave stating personal issues. This nurse called general surgery MAI Alvarez and discussed pt status. Pt prescribed PO antibiotics for discharge, surgical site packing removed per order, discharge order placed. Pt given all instructions on keeping incision clean, dry and taking antibiotics as prescribed. Pt advised on assessing for signs of infection as well.

## 2022-05-18 NOTE — CARE COORDINATION
Discharge order noted, however, patient has already left the building. Respectfully submitted,    HAILY Wu  [facility] [address]   [phone]    Electronically signed by HAILY Leigh on 5/18/2022 at 9:07 AM

## 2022-05-18 NOTE — PLAN OF CARE
Problem: Pain  Goal: Verbalizes/displays adequate comfort level or baseline comfort level  5/18/2022 0027 by Floresita Daniels RN  Outcome: Progressing  5/17/2022 1909 by Nia Adame RN  Outcome: Progressing  Flowsheets (Taken 5/17/2022 1842)  Verbalizes/displays adequate comfort level or baseline comfort level:   Encourage patient to monitor pain and request assistance   Assess pain using appropriate pain scale   Administer analgesics based on type and severity of pain and evaluate response     Problem: ABCDS Injury Assessment  Goal: Absence of physical injury  5/18/2022 0027 by Floresita Daniels RN  Outcome: Progressing  5/17/2022 1909 by Nia Adame RN  Outcome: Progressing     Problem: Skin/Tissue Integrity - Adult  Goal: Incisions, wounds, or drain sites healing without S/S of infection  Outcome: Progressing

## 2022-05-21 LAB
ANAEROBIC CULTURE: ABNORMAL
BLOOD CULTURE, ROUTINE: NORMAL
CULTURE, BLOOD 2: NORMAL
GRAM STAIN RESULT: ABNORMAL
ORGANISM: ABNORMAL
ORGANISM: ABNORMAL
WOUND/ABSCESS: ABNORMAL

## 2022-06-02 ENCOUNTER — OFFICE VISIT (OUTPATIENT)
Dept: SURGERY | Age: 37
End: 2022-06-02

## 2022-06-02 VITALS
DIASTOLIC BLOOD PRESSURE: 79 MMHG | WEIGHT: 132 LBS | BODY MASS INDEX: 21.31 KG/M2 | HEART RATE: 91 BPM | SYSTOLIC BLOOD PRESSURE: 134 MMHG

## 2022-06-02 DIAGNOSIS — K61.0 PERIANAL ABSCESS: Primary | ICD-10-CM

## 2022-06-02 DIAGNOSIS — K76.89 LIVER NODULE: ICD-10-CM

## 2022-06-02 PROCEDURE — 99024 POSTOP FOLLOW-UP VISIT: CPT | Performed by: SURGERY

## 2022-06-02 RX ORDER — SULFAMETHOXAZOLE AND TRIMETHOPRIM 800; 160 MG/1; MG/1
1 TABLET ORAL 2 TIMES DAILY
Qty: 20 TABLET | Refills: 0 | Status: SHIPPED | OUTPATIENT
Start: 2022-06-02 | End: 2022-06-12

## 2022-06-02 NOTE — PROGRESS NOTES
Post Operative Visit    [address]  [facility]   Stephan Patel MD    [address], [address]  [place]  Phone: [phone]  Fax: [phone]    Cristy Beavers   YOB: 1985    Date of Visit:  6/2/2022    No ref. provider found  No primary care provider on file. Subjective:     Cristy Beavers presents for a two week follow-up s/p I&D of right perianal abscess. Overall he has been doing better since his discharge. His pain has resolved. He is still having some drainage from the area but he thinks it is separate from the I&D site. He still has a non-tender nodule present. He denies any fevers or chills. No Known Allergies  Outpatient Medications Marked as Taking for the 6/2/22 encounter (Office Visit) with Jan Cesar MD   Medication Sig Dispense Refill    sulfamethoxazole-trimethoprim (BACTRIM DS;SEPTRA DS) 800-160 MG per tablet Take 1 tablet by mouth 2 times daily for 10 days 20 tablet 0       Vitals:    06/02/22 1347   BP: 134/79   Pulse: 91   Weight: 132 lb (59.9 kg)     Body mass index is 21.31 kg/m².      Wt Readings from Last 3 Encounters:   06/02/22 132 lb (59.9 kg)   05/18/22 130 lb 4.7 oz (59.1 kg)   11/04/17 197 lb 5 oz (89.5 kg)     BP Readings from Last 3 Encounters:   06/02/22 134/79   05/18/22 (!) 110/57   11/04/17 (!) 111/51          Objective:    CONSTITUTIONAL:  awake, alert, no apparent distress    LUNGS:  Resp easy and unlabored  MUSCULOSKELETAL: No edema  NEUROLOGIC:  Mental Status Exam:  Level of Alertness:   awake  Orientation:   person, place, time  SKIN: right perianal area with 1.5 cm cutaneous nodule consistent with cyst vs less likely fistula tract, no induration      Microbiology:  Gram Stain Result  Abnormal   1+ Gram positive cocci   1+ WBC's (Polymorphonuclear)   No Epithelial Cells seen     Organism Staphylococcus warneri Abnormal     WOUND/ABSCESS Isolated from Broth    Organism Peptostreptococcus anaerobius Abnormal     Anaerobic Culture Moderate growth   No further workup          ASSESSMENT:     Diagnosis Orders   1. Perianal abscess  sulfamethoxazole-trimethoprim (BACTRIM DS;SEPTRA DS) 800-160 MG per tablet   2. Liver nodule  MRI ABDOMEN W WO CONTRAST       PLAN:    Gray Epstein is doing well. His drainage has slowed down from his perianal abscess but he still has a residual nodule. Difficult to determine if this is a residual cyst vs fistula. Will give 10 days of bactrim and re-evaluate it in 2-3 weeks. If it is persistent, will schedule for rectal exam under anesthesia with excision of fistula vs perianal cyst.  For his liver nodule, will obtain an MRI with liver protocol. He does have a history of EtOH abuse but has been sober x 14 months. We will discuss imaging results with his next appointment.     Electronically signed by John Victor MD on 6/2/2022 at 2:10 PM

## 2022-06-07 NOTE — DISCHARGE SUMMARY
General Surgery Discharge Summary        Joel Bryan   : 1985 MRN: [id number]  Date of Admission: 2022  Date of Discharge: 22  Admitting Jaspal Santoyo MD  Primary Care Physician: No primary care provider on file. Summary by: Alf Merida PA-C    Diagnosis Present on Admission:   Perianal abscess    Secondary diagnosis:   Patient Active Problem List   Diagnosis    Perianal abscess     Summary of hospital stay:   Joel Bryan is a 39 y.o. male admitted for a perianal abscess. He first noticed swelling and possible cyst located on the right buttock/perianal area over the past few months. Typically he has been able to express the fluid collection manually. However this time, the swelling worsened and went deeper. On examination the area had induration and was erythematous with fluctuance. At that time we discussed the need to have the abscess opened. He was given all the risk and benefits of the procedure and agreed to proceed. He tolerated the incision and drainage of the perirectal abscess well. HE was given IV antibiotics and monitored. The next day he had significant improvement. No purulent drainage was noted. Pain was minimal, and he was able to eat and ambulate without any difficulty. He did well throughout his hospital stay and was discharged home in stable condition. Discharge Medications:  Discharge Medication List as of 2022  7:57 AM      START taking these medications    Details   ciprofloxacin (CIPRO) 500 MG tablet Take 1 tablet by mouth 2 times daily for 10 days, Disp-20 tablet, R-0Normal             Discharged to:  home    Instruction:  The patient is instructed to return to the hospital or call the office for fevers > 101.5F, inability to tolerate a diet, or unexpected pain. Surgery specific discharge instruction sheet was provided to the patient.   Diet: regular diet   Activity: activity as tolerated    Follow up:  Dr. Lily Fong in 1-2 weeks    Electronically signed by Isabel Molina PA-C on 6/7/2022 at 12:46 PM

## 2022-06-20 ENCOUNTER — OFFICE VISIT (OUTPATIENT)
Dept: SURGERY | Age: 37
End: 2022-06-20

## 2022-06-20 VITALS
HEART RATE: 63 BPM | DIASTOLIC BLOOD PRESSURE: 66 MMHG | SYSTOLIC BLOOD PRESSURE: 110 MMHG | BODY MASS INDEX: 22.76 KG/M2 | WEIGHT: 141 LBS

## 2022-06-20 DIAGNOSIS — K76.89 LIVER NODULE: ICD-10-CM

## 2022-06-20 DIAGNOSIS — K61.0 PERIANAL ABSCESS: Primary | ICD-10-CM

## 2022-06-20 PROCEDURE — 99024 POSTOP FOLLOW-UP VISIT: CPT | Performed by: SURGERY

## 2022-06-20 NOTE — PROGRESS NOTES
Established Patient Visit    [facility]  [facility] and Vascular Surgery   Imelda Lott MD    [address], [address]  [place]  Phone: [phone]  Fax: [phone]    Cristi Booker   YOB: 1985    Date of Visit:  6/20/2022    No ref. provider found  No primary care provider on file. Subjective:     Cristi Booker presents for a follow-up of his perianal abscess and nodule. Overall he has been doing well since his last visit. He completed his course of antibiotics and is no longer having drainage. He does still having a nodule but it is not painful. He denies any fevers or chills. He has not performed his MRI because he is waiting getting insurance before performing it. No Known Allergies  No outpatient medications have been marked as taking for the 6/20/22 encounter (Office Visit) with Shannan Al MD.       Vitals:    06/20/22 0950   BP: 110/66   Pulse: 63   Weight: 141 lb (64 kg)     Body mass index is 22.76 kg/m². Wt Readings from Last 3 Encounters:   06/20/22 141 lb (64 kg)   06/02/22 132 lb (59.9 kg)   05/18/22 130 lb 4.7 oz (59.1 kg)     BP Readings from Last 3 Encounters:   06/20/22 110/66   06/02/22 134/79   05/18/22 (!) 110/57          Objective:    CONSTITUTIONAL:  awake, alert, no apparent distress    LUNGS:  Resp easy and unlabored  MUSCULOSKELETAL: No edema  NEUROLOGIC:  Mental Status Exam:  Level of Alertness:   awake  Orientation:   person, place, time  SKIN: right perianal nodule approximately 1 x 2 cm without tenderness or overlying erythema, no drainage        ASSESSMENT:     Diagnosis Orders   1. Perianal abscess     2. Liver nodule           PLAN:    Cristi Booker is doing better. His perianal abscess is no longer draining. He still has a nodule which is non-tender and most consistent with a cyst.  He would like to monitor it for now, which I think is reasonable.   If it enlarges or becomes tender I recommended that he return and we will schedule excision. For his liver nodule, he is waiting on getting insurance prior to getting the MRI of his liver. We will review his MRI results when available.     Electronically signed by Jan Cesar MD on 6/20/2022 at 10:14 AM

## 2022-10-26 ENCOUNTER — HOSPITAL ENCOUNTER (EMERGENCY)
Age: 37
Discharge: HOME | End: 2022-10-26
Attending: EMERGENCY MEDICINE
Payer: MEDICAID

## 2022-10-26 VITALS
BODY MASS INDEX: 22.25 KG/M2 | WEIGHT: 138.45 LBS | TEMPERATURE: 97 F | HEART RATE: 66 BPM | SYSTOLIC BLOOD PRESSURE: 105 MMHG | HEIGHT: 66 IN | DIASTOLIC BLOOD PRESSURE: 67 MMHG | OXYGEN SATURATION: 99 % | RESPIRATION RATE: 16 BRPM

## 2022-10-26 DIAGNOSIS — K61.0 PERIANAL ABSCESS: Primary | ICD-10-CM

## 2022-10-26 DIAGNOSIS — R21 RASH: ICD-10-CM

## 2022-10-26 PROCEDURE — 6370000000 HC RX 637 (ALT 250 FOR IP)

## 2022-10-26 PROCEDURE — 99283 EMERGENCY DEPT VISIT LOW MDM: CPT

## 2022-10-26 RX ORDER — THERMOMETER, ELECTRONIC,ORAL
EACH MISCELLANEOUS ONCE
Status: DISCONTINUED | OUTPATIENT
Start: 2022-10-26 | End: 2022-10-26 | Stop reason: HOSPADM

## 2022-10-26 RX ORDER — AMOXICILLIN AND CLAVULANATE POTASSIUM 875; 125 MG/1; MG/1
1 TABLET, FILM COATED ORAL EVERY 12 HOURS SCHEDULED
Status: DISCONTINUED | OUTPATIENT
Start: 2022-10-26 | End: 2022-10-26 | Stop reason: HOSPADM

## 2022-10-26 RX ORDER — PRENATAL VIT 91/IRON/FOLIC/DHA 28-975-200
COMBINATION PACKAGE (EA) ORAL ONCE
Status: DISCONTINUED | OUTPATIENT
Start: 2022-10-26 | End: 2022-10-26 | Stop reason: ALTCHOICE

## 2022-10-26 RX ORDER — PRENATAL VIT 91/IRON/FOLIC/DHA 28-975-200
COMBINATION PACKAGE (EA) ORAL
Qty: 1 EACH | Refills: 0 | Status: SHIPPED | OUTPATIENT
Start: 2022-10-26

## 2022-10-26 RX ORDER — AMOXICILLIN AND CLAVULANATE POTASSIUM 875; 125 MG/1; MG/1
1 TABLET, FILM COATED ORAL 2 TIMES DAILY
Qty: 14 TABLET | Refills: 0 | Status: SHIPPED | OUTPATIENT
Start: 2022-10-26 | End: 2022-11-02

## 2022-10-26 RX ADMIN — AMOXICILLIN AND CLAVULANATE POTASSIUM 1 TABLET: 875; 125 TABLET, FILM COATED ORAL at 13:46

## 2022-10-26 ASSESSMENT — PAIN SCALES - GENERAL: PAINLEVEL_OUTOF10: 7

## 2022-10-26 ASSESSMENT — ENCOUNTER SYMPTOMS
NAUSEA: 0
VOMITING: 0
RHINORRHEA: 0
BACK PAIN: 0
DIARRHEA: 0
SHORTNESS OF BREATH: 0
ABDOMINAL PAIN: 0
SORE THROAT: 0
COUGH: 0
CONSTIPATION: 0
EYE PAIN: 0

## 2022-10-26 ASSESSMENT — PAIN DESCRIPTION - DESCRIPTORS: DESCRIPTORS: DISCOMFORT

## 2022-10-26 ASSESSMENT — PAIN - FUNCTIONAL ASSESSMENT: PAIN_FUNCTIONAL_ASSESSMENT: 0-10

## 2022-10-26 ASSESSMENT — PAIN DESCRIPTION - PAIN TYPE: TYPE: ACUTE PAIN

## 2022-10-26 ASSESSMENT — PAIN DESCRIPTION - LOCATION: LOCATION: RECTUM

## 2022-10-26 NOTE — ED PROVIDER NOTES
[facility]        Pt Name: Delfino Nageotte  MRN: [id number]  [place] 1985  Date of evaluation: 10/26/2022  Provider: KATLYN Phillips CNP  PCP: No primary care provider on file. Note Started: 5:36 PM EDT       I have seen and evaluated this patient with my supervising physician Dr Tami Suarez. Triage CHIEF COMPLAINT       Chief Complaint   Patient presents with    Abscess     Hx perirectal cyst. Patient with rash to forehead. Rash         HISTORY OF PRESENT ILLNESS   (Location/Symptom, Timing/Onset, Context/Setting, Quality, Duration, Modifying Factors, Severity)  Note limiting factors. Chief Complaint: Above Delfino Nageotte is a 44 y.o. male who presents to the due to concerns. 1.  Perianal cyst which started draining over the last couple of days. History of same in recent past for which she saw general surgery and had drainage. This 1 is draining on his own. Started draining roughly 2 days ago. No fevers or chills. No incontinence. Not immunocompromised or diabetic. 2.  Small rash to right side of forehead present for the past couple of days. No fevers or chills    Nursing Notes were all reviewed and agreed with or any disagreements were addressed in the HPI. REVIEW OF SYSTEMS    (2-9 systems for level 4, 10 or more for level 5)     Review of Systems   Constitutional:  Negative for chills, diaphoresis and fever. HENT:  Negative for congestion, rhinorrhea and sore throat. Eyes:  Negative for pain and visual disturbance. Respiratory:  Negative for cough and shortness of breath. Cardiovascular:  Negative for chest pain and leg swelling. Gastrointestinal:  Negative for abdominal pain, constipation, diarrhea, nausea and vomiting. Genitourinary:  Negative for frequency and hematuria. Musculoskeletal:  Negative for back pain and neck pain. Skin:  Positive for rash and wound.    Neurological: Negative for dizziness and light-headedness. PAST MEDICAL HISTORY   History reviewed. No pertinent past medical history. SURGICAL HISTORY   History reviewed. No pertinent surgical history. [address]       Discharge Medication List as of 10/26/2022  1:45 PM          ALLERGIES     Patient has no known allergies. FAMILYHISTORY     History reviewed. No pertinent family history. SOCIAL HISTORY       Social History     Socioeconomic History    Marital status:      Spouse name: None    Number of children: None    Years of education: None    Highest education level: None   Tobacco Use    Smoking status: Every Day     Packs/day: 0.50     Types: Cigarettes    Smokeless tobacco: Never   Substance and Sexual Activity    Alcohol use: Yes    Drug use: Yes     Comment: claims \"the good ones\"        SCREENINGS    Stratton Coma Scale  Eye Opening: Spontaneous  Best Verbal Response: Oriented  Best Motor Response: Obeys commands  Anthony Coma Scale Score: 15        PHYSICAL EXAM    (up to 7 for level 4, 8 or more for level 5)     ED Triage Vitals [10/26/22 1304]   BP Temp Temp Source Heart Rate Resp SpO2 Height Weight   105/67 97 °F (36.1 °C) Oral 66 16 99 % 5' 6\" (1.676 m) 138 lb 7.2 oz (62.8 kg)       Physical Exam  Vitals and nursing note reviewed. Exam conducted with a chaperone present (Nurse Crystal chaperoning exam). Constitutional:       General: He is not in acute distress. Appearance: Normal appearance. He is normal weight. He is not ill-appearing or diaphoretic. HENT:      Head: Normocephalic and atraumatic. Right Ear: External ear normal.      Left Ear: External ear normal.      Nose: Nose normal. No congestion or rhinorrhea. Mouth/Throat:      Mouth: Mucous membranes are moist.      Pharynx: Oropharynx is clear. No oropharyngeal exudate or posterior oropharyngeal erythema. Eyes:      General: No scleral icterus. Right eye: No discharge.          Left eye: No discharge. Extraocular Movements: Extraocular movements intact. Conjunctiva/sclera: Conjunctivae normal.      Pupils: Pupils are equal, round, and reactive to light. Cardiovascular:      Rate and Rhythm: Normal rate and regular rhythm. Pulses: Normal pulses. Heart sounds: Normal heart sounds. No murmur heard. No friction rub. No gallop. Pulmonary:      Effort: Pulmonary effort is normal. No respiratory distress. Breath sounds: Normal breath sounds. No stridor. No wheezing, rhonchi or rales. Abdominal:      General: Abdomen is flat. Bowel sounds are normal. There is no distension. Palpations: Abdomen is soft. Tenderness: There is no abdominal tenderness. There is no right CVA tenderness, left CVA tenderness or guarding. Genitourinary:      Musculoskeletal:         General: No deformity. Normal range of motion. Cervical back: Normal range of motion and neck supple. No rigidity. Lymphadenopathy:      Cervical: No cervical adenopathy. Skin:     General: Skin is warm and dry. Capillary Refill: Capillary refill takes less than 2 seconds. Coloration: Skin is not jaundiced or pale. Findings: No bruising or rash. Neurological:      General: No focal deficit present. Mental Status: He is alert and oriented to person, place, and time. Mental status is at baseline. Psychiatric:         Mood and Affect: Mood normal.         Behavior: Behavior normal.       DIAGNOSTIC RESULTS   LABS:    Labs Reviewed - No data to display    When ordered, only abnormal lab results are displayed. All other labs were within normal range or not returned as of this dictation. EKG: When ordered, EKG's are interpreted by the Emergency Department Physician in the absence of a cardiologist.  Please see their note for interpretation of EKG.       RADIOLOGY:   Non-plain film images such as CT, Ultrasound and MRI are read by the radiologist. Plain radiographic images are visualized andpreliminarily interpreted by the  ED Provider with the below findings:        Interpretation perthe Radiologist below, if available at the time of this note:    No orders to display     No results found. PROCEDURES   Unless otherwise noted below, none     Procedures    CRITICAL CARE TIME   N/A    CONSULTS:  None      EMERGENCY DEPARTMENT COURSE and DIFFERENTIAL DIAGNOSIS/MDM:   Vitals:    Vitals:    10/26/22 1304   BP: 105/67   Pulse: 66   Resp: 16   Temp: 97 °F (36.1 °C)   TempSrc: Oral   SpO2: 99%   Weight: 138 lb 7.2 oz (62.8 kg)   Height: 5' 6\" (1.676 m)       Patient was given thefollowing medications:  Medications - No data to display      Is this patient to be included in the SEP-1 Core Measure due to severe sepsis or septic shock? No   Exclusion criteria - the patient is NOT to be included for SEP-1 Core Measure due to:  2+ SIRS criteria are not met    Regarding perianal abscess. -  Differential diagnosis: necrotizing fasciitis, deep space soft tissue bacterial skin infection, viral rash, systemic infectious rash, aseptic cyst, malignancy, other    Appears to be superficial unfortunately no area of fluctuance. There is some induration but no surrounding erythema. It is draining on its own. At this time patient will be given course antibiotics with close follow-up with general surgery as needed. Nontoxic appearance, they are afebrile and without co-morbid conditions to complicate this. Vitals reviewed and are stable. There is no indication for labs or imaging studies at this time. Patient verbalized comprehension of close follow-up and need for recheck. Instructions provided to apply warm compresses at home. Patient was counseled to return immediately if worse or any change in signs or symptoms, specifically, worsening of pain, redness, swelling, numbness, loss of function or a fever. Pain management and follow-up plan was discussed with the patient.      Patient is without significant evidence of cellulitis, compartment syndrome, necrotizing fasciitis, tendon/neurovascular injury, foreign body, toxicity, shock, sepsis, unstable arrhythmia, hemodynamic or cardiopulmonary instability, herpes zoster, anaphylaxis, sepsis, meningitis or meningococcemia, vasculitis, TSS, SJS,  or other process requiring immediate medical or surgical intervention. Regarding rash:  Differential diagnosis: Vasculitis, bacterial skin infection, viral rash, systemic infectious rash, Anaphylaxis, Urticaria, other    Pt is afebrile, in NAD and nontoxic in appearance. There is no petechiae or purpura. There is no angioedema or respiratory symptoms. Rash appears to be fungal in nature based on presentation. As such will discharge home with terbinafine    Will have antibiotic to take at home and close return precautions    The patient is at low risk for mortality based on demographic, history and clinical factors. Given the best available information and clinical assessment, I estimate the risk of hospitalization to be greater than risk of treatment at home. I have explained to the patient that the risk could rapidly change, given precautions for return and instructions. Explained to patient that the risk for mortality is low based on demographic, history and clinical factors. I discussed with patient the results of evaluation in the ED, diagnosis, care, and prognosis. The plan is to discharge to home. Patient is in agreement with plan and questions have been answered. I also discussed with patient the reasons which may require a return visit and the importance of follow-up care. The patient is well-appearing, nontoxic, and improved at the time of discharge. Patient agrees to call to arrange follow-up care as directed. Patient understands to return immediately for worsening/change in symptoms. I am the Primary Clinician of Record. FINAL IMPRESSION      1. Perianal abscess    2.  Rash DISPOSITION/PLAN   DISPOSITION Decision To Discharge 10/26/2022 01:27:31 PM      PATIENT REFERREDTO:  Lisa Esparza  [phone]  Call in 2 days  Establish appointment with a PCP    Arpit Terry MD  [address] [place] [id number]  [phone]    Call in 1 day  Follow up on your recent ED visit      DISCHARGE MEDICATIONS:  Discharge Medication List as of 10/26/2022  1:45 PM        START taking these medications    Details   amoxicillin-clavulanate (AUGMENTIN) 875-125 MG per tablet Take 1 tablet by mouth 2 times daily for 7 days, Disp-14 tablet, R-0Normal      terbinafine (LAMISIL) 1 % cream Apply topically 2 times daily. , Disp-1 each, R-0, Normal             DISCONTINUED MEDICATIONS:  Discharge Medication List as of 10/26/2022  1:45 PM                 (Please note that portions ofthis note were completed with a voice recognition program.  Efforts were made to edit the dictations but occasionally words are mis-transcribed.)    KATLYN Phillips CNP (electronically signed)             KATLYN Phillips CNP  10/26/22 1875

## 2022-10-26 NOTE — DISCHARGE INSTRUCTIONS
Please take all medication as prescribed. Return to the ED for any worsening symptoms.   Please call Dr. Karlo Kohli and follow-up with him

## 2022-10-26 NOTE — ED PROVIDER NOTES
I have personally performed a face to face diagnostic evaluation on this patient. I have fully participated in the care of this patient I personally saw the patient and performed a substantive portion of the visit including all aspects of the medical decision making. I have reviewed and agree with all pertinent clinical information including history, physical exam, diagnostic tests, and the plan. HPI: Carolyn Rosenberg presented with rectal abscess. Patient has a history of recurrent rectal abscess. Patient follows with general surgery and had prior incision and drainage. Has been gradually worsening over the last few days. Some purulent drainage. No redness. Mild tenderness. No pain with defecation. No fevers or chills. No recent antibiotics.   Chief Complaint   Patient presents with    Abscess     Hx perirectal cyst. Patient with rash to forehead. Rash      Review of Systems: See LO note  Vital Signs: /67   Pulse 66   Temp 97 °F (36.1 °C) (Oral)   Resp 16   Ht 5' 6\" (1.676 m)   Wt 138 lb 7.2 oz (62.8 kg)   SpO2 99%   BMI 22.35 kg/m²     Alert 44 y.o. male who does not appear toxic or acutely ill  HENT: Atraumatic, oral mucosa moist  Neck: Grossly normal ROM  Chest/Lungs: respiratory effort normal   Abdomen: Soft nontender  : Perianal abscess with induration and mild fluctuance with active drainage. No surrounding erythema  Musculoskeletal: Grossly normal ROM  Skin: No palor or diaphoresis    Medical Decision Making and Plan:  Pertinent Labs & Imaging studies reviewed. (See LO chart for details)  I agree with LO assessment and plan. Patient with perianal abscess which is visible on the outside of the rectum with no rectal tenderness no pain with defecation afebrile not tachycardic saturating well on room air. Patient does not appear systemically septic. Actual drainage at this time no surrounding erythema.   We will restart patient on antibiotics as well as give him follow-up with general surgery. Patient understands that if he has any worsening of his symptoms he is return immediately to the ER for further work-up and IV antibiotics. See LO note for further details.     I personally saw the patient and independently provided 0 minutes of nonconcurrent critical care out of the total shared critical care time provided       Francisca River MD  10/26/22 2902

## 2022-11-07 ENCOUNTER — OFFICE VISIT (OUTPATIENT)
Dept: SURGERY | Age: 37
End: 2022-11-07
Payer: MEDICAID

## 2022-11-07 VITALS
WEIGHT: 142 LBS | DIASTOLIC BLOOD PRESSURE: 75 MMHG | BODY MASS INDEX: 22.92 KG/M2 | SYSTOLIC BLOOD PRESSURE: 111 MMHG | HEART RATE: 62 BPM

## 2022-11-07 DIAGNOSIS — K61.0 PERIANAL ABSCESS: Primary | ICD-10-CM

## 2022-11-07 DIAGNOSIS — K76.89 LIVER NODULE: ICD-10-CM

## 2022-11-07 PROCEDURE — 99213 OFFICE O/P EST LOW 20 MIN: CPT | Performed by: SURGERY

## 2022-11-07 NOTE — LETTER
Surgery Scheduling Form:      DEMOGRAPHICS:                                                                                                         .    Patient Name:  Tiana Ríos  Patient :  1985   Patient SS#:      Patient Phone:  [phone] (home)  Alt. Patient Phone:                     Patient Address:  [address] [address]    PCP:  No primary care provider on file. Insurance:  [place] / [place] DEPT [place] / Product Type: *No Product type* /   Insurance ID Number:    Payer/Plan Subscr  Sex Relation Sub. Ins. ID Effective Group Num   1. [place] -* HILLARY VANG 1985 Male Self [id number]                                    [address]         DIAGNOSIS & PROCEDURE:                                                                                       .    Diagnosis:   K61.0  Perianal abscess   Operation:  Rectal exam under anaesthesia with possible fistulotomy            Possible excision of perianal cyst   Location: [facility] [place] [facility] [place]   Surgeon:    Isela Peacock MD        [facility] INFORMATION:                                                                                    .    Surgeon's Scheduling Instruction:  elective  Requested Date: 2022    OR Time: 7:30am          Patient Arrival Time: 6:00am  OR Time Required:  60  Minutes  Anesthesia:  General  Equipment:                                                             SA Required:  Yes     Status:  Outpatient           Standard C-Arm:  No   PAT Required:   Yes                               Best Time to Call:  AM  Patient Requested to see PCP for Pre-op H & P:  No   Special Comments:                              Isela Peacock MD    22 at 71:22 AM        PRE-CERTIFICATION INFORMATION:                                                                           .    Procedure:       CPT Code Modifier          44690          [id number]          [id number]

## 2022-11-07 NOTE — PATIENT INSTRUCTIONS
Surgeon: Chester Salmeron MD  Your surgery will be at [facility] [place]  First [facility] [address] [place] [address]    Date:    Arrival time:    Surgery time:     (   ) Outpatient with general anesthesia     Nothing to eat or drink after midnight the night before. FASTING SURGERY  You may take all your regular maintenance prescriptions in the morning with a small sip of water to wash them down. You will need to have a  drive you home from the hospital.   Also for your safety, it is strongly suggested that someone stay with you the first 24 hours after your surgery. No driving for 1 week after surgery. (Or while on pain medication) . You will need to bring a photo ID and insurance card    Please contact pre-admission testing if you have any further questions. [phone]    Please contact Lizeth if you need to reschedule your surgery.    Office phone number:     [phone]  Fax number for [facility]:    [phone]

## 2022-11-07 NOTE — LETTER
CONSENT TO OPERATION         Rectal exam under anaesthesia with possible fistulotomy      Possible excision of perianal cyst     PATIENT : Cas Quesada OF BIRTH:  1985             DATE : 11/7/22      1. I request and consent that Dr. Kalen Lane and/or his associates or assistants perform an operation and/or procedures on the above patient at [facility], to treat the condition(s) which appear indicated by the diagnostic studies already performed. 2. It has been explained to me by the informing physician that during the course of the operation and/or procedure(s) unforeseen conditions may be revealed that necessitate an extension of the original operation and/or procedure(s) or different operation and/or procedures than those set forth in Paragraph 1. I therefore authorize and request that my physician and/or his associates or assistants perform such operations and/or procedures as are necessary and desirable in the exercise of professional judgment. The authority granted under this Paragraph 2 shall extend to all conditions that require treatment and are known to my physician at the time the operation is commenced. 3. I have been made aware by the informing physician of certain risks and consequences that are associated with the operation and/or procedure(s) described in Paragraph 1, the reasonable alternative methods or treatment, the possible consequences, the possibility that the operation and/or procedure(s) may be unsuccessful and the possibility of complications. I understand the reasonably known risks to be:  - Bleeding  - Infection  - Poor Healing  - Possible Damage to Nerve, Vessel, Tendon/Muscle or Bone  - Need for further Treatment/Surgery  - Stiffness  - Pain  - Residual or Recurrent Symptoms  - Anesthetic and/or Medical Risks     4.  I have also been informed by the informing physician that there are other risks from both known and unknown causes that are attendant to the performance of any surgical procedure. I am aware that the practice of medicine and surgery is not an exact science, and that no guarantees have been made to me concerning the results of the operation and/or procedure(s). 5. I consent to the administration of anesthesia and to the use of such anesthetics as may be deemed advisable by the anesthesiologist who has been engaged by me or my physician. 6. I certify that I have read and understand the above consent to operation and/or other procedure(s); that the explanations therein referred to were made to me by the informing physician in advance of my signing this consent; that all blanks or statements requiring insertion or completion were filled in and inapplicable paragraphs, if any, were stricken before I signed; and that all questions asked by me about the operation and/or procedure(s) which I have consented to have been fully answered in a satisfactory manner.            11/7/22                    _______________________  Signature Of Patient   Date              Witness                     OR Date:  ___________

## 2022-11-11 NOTE — PROGRESS NOTES
Established Patient Visit    [facility]  [place] [facility]   Loki Mcleod MD    [address]  [place]  Phone: [phone]  Fax: [phone]    Kiara Carrion   YOB: 1985    Date of Visit:  11/7/2022    No ref. provider found  No primary care provider on file. Subjective:     Kiara Carrion presents for a follow-up of his perianal abscess. He had some inflammation and drainage of the area near his last infection. He went to the ED and was started on augmentin. It has since been improving. No fevers or chills. He still has an adjacent nodule present. No Known Allergies  Outpatient Medications Marked as Taking for the 11/7/22 encounter (Office Visit) with Manuel Knight MD   Medication Sig Dispense Refill    terbinafine (LAMISIL) 1 % cream Apply topically 2 times daily. 1 each 0       Vitals:    11/07/22 1114   BP: 111/75   Pulse: 62   Weight: 142 lb (64.4 kg)     Body mass index is 22.92 kg/m². Wt Readings from Last 3 Encounters:   11/07/22 142 lb (64.4 kg)   10/26/22 138 lb 7.2 oz (62.8 kg)   06/20/22 141 lb (64 kg)     BP Readings from Last 3 Encounters:   11/07/22 111/75   10/26/22 105/67   06/20/22 110/66          Objective:    CONSTITUTIONAL:  awake, alert, no apparent distress    LUNGS:  Resp easy and unlabored  ABDOMEN:  soft, NT, ND  MUSCULOSKELETAL: No edema  NEUROLOGIC:  Mental Status Exam:  Level of Alertness:   awake  Orientation:   person, place, time  SKIN: right perianal nodule approximately 1 x 2 cm without erythema or fluctuance, no drainage      ASSESSMENT:     Diagnosis Orders   1. Perianal abscess        2. Liver nodule            PLAN:    Kiara Carrion has a persistent perianal nodule with possible perianal fistula. His current abscess is draining appropriately. I recommended rectal exam under anesthesia with excision of perianal nodule, possible fistulotomy under general anesthesia.   The risks, benefits, and alternatives were discussed with the patient and he is willing to consent for the operation. For his liver nodule, I again stressed the importance of getting his liver MRI. He stated that now that he has insurance he plans on getting it. Order reprinted for him.     Electronically signed by Manuel Knight MD

## 2022-11-21 ENCOUNTER — HOSPITAL ENCOUNTER (OUTPATIENT)
Dept: MRI IMAGING | Age: 37
Discharge: HOME | End: 2022-11-21
Payer: MEDICAID

## 2022-11-21 DIAGNOSIS — K76.89 LIVER NODULE: ICD-10-CM

## 2022-11-21 PROCEDURE — 74183 MRI ABD W/O CNTR FLWD CNTR: CPT

## 2022-11-21 PROCEDURE — 6360000004 HC RX CONTRAST MEDICATION: Performed by: SURGERY

## 2022-11-21 PROCEDURE — A9577 INJ MULTIHANCE: HCPCS | Performed by: SURGERY

## 2022-11-21 RX ADMIN — GADOBENATE DIMEGLUMINE 13 ML: 529 INJECTION, SOLUTION INTRAVENOUS at 10:49

## 2022-12-05 NOTE — PROGRESS NOTES
Preoperative Screening for Elective Surgery/Invasive Procedures While COVID-19 present in the community     Have you tested positive or have been told to self-isolate for COVID-19 like symptoms within the past 28 days? Do you currently have any of the following symptoms? Fever >100.0 F or 99.9 F in immunocompromised patients? New onset cough, shortness of breath or difficulty breathing? New onset sore throat, myalgia (muscle aches and pains), headache, loss of taste/smell or diarrhea? Have you had a potential exposure to COVID-19 within the past 14 days by:  Close contact with a confirmed case? Close contact with a healthcare worker,  or essential infrastructure worker (grocery store, [employer], gas station, public utilities or transportation)? Do you reside in a congregate setting such as; skilled nursing facility, adult home, correctional facility, homeless shelter or other institutional setting? Have you had recent travel to a known COVID-19 hotspot? No to all above     * Admitted with diarrhea? [] YES    [x]  NO     *Prior history of C-Diff. In last 3 months? [] YES    [x]  NO     *Antibiotic use in the past 6-8 weeks? [x]  NO    []  YES      If yes, which: REASON_________________     *Prior hospitalization or nursing home in the last month? []  YES    [x]  NO     SAFETY FIRST. .call before you fall    [facility] time_12/13/22 0600___________        Surgery time_____0730_______    Do not eat or drink anything after 12:00 midnight prior to your surgery. This includes water chewing gum, mints and ice chips- the Day of Surgery. You may brush your teeth and gargle the morning of your surgery, but do not swallow the water     Please see your family doctor/pediatrician for a history and physical and/or questions concerning medications. Bring any test results/reports from your physicians office.    If you are under the care of a heart doctor or specialist doctor, please be aware that you may be asked to them for clearance    You may be asked to stop blood thinners such as Coumadin, Plavix, Fragmin, Lovenox, etc., or any anti-inflammatories such as:  Aspirin, Ibuprofen, Advil, Naproxen prior to your surgery. We also ask that you stop any OTC medications such as fish oil, vitamin E, glucosamine, garlic, Multivitamins, COQ 10, etc.    We ask that you do not smoke 24 hours prior to surgery  We ask that you do not  drink any alcoholic beverages 24 hours prior to surgery     You must make arrangements for a responsible adult to take you home after your surgery. For your safety you will not be allowed to leave alone or drive yourself home. Your surgery will be cancelled if you do not have a ride home. Also for your safety, it is strongly suggested that someone stay with you the first 24 hours after your surgery. A parent or legal guardian must accompany a child scheduled for surgery and plan to stay at the hospital until the child is discharged. Please do not bring other children with you. For your comfort, please wear simple loose fitting clothing to the hospital.  Please do not bring valuables. Do not wear any make-up or nail polish on your fingers or toes. For your safety, please do not wear any jewelry or body piercing's on the day of surgery. All jewelry must be removed. If you have dentures, they will be removed before going to operating room. For your convenience, we will provide you with a container. If you wear contact lenses or glasses, they will be removed, please bring a case for them. If you have a living will and a durable power of  for healthcare, please bring in a copy.      As part of our patient safety program to minimize surgical site infections, we ask you to do the following:    Please notify your surgeon if you develop any illness between         now and the day of your surgery. This includes a cough, cold, fever, sore throat, nausea,         or vomiting, and diarrhea, etc.   Please notify your surgeon if you experience dizziness, shortness         of breath or blurred vision between now and the time of your surgery. Do not shave your operative site 96 hours prior to surgery. For face and neck surgery, men may use an electric razor 48 hours   prior to surgery. You may shower the night before surgery or the morning of   your surgery with an antibacterial soap. You will need to bring a photo ID and insurance card     If you use a C-pap or Bi-pap machine, please bring your machine with you to the hospital     Our goal is to provide you with excellent care, therefore, visitors will be limited to so that we may focus on providing this care for you. Please contact your surgeon office, if you have any further questions. [facility] phone number:  [phone] [facility] fax number:  [phone]    Please note these are generalized instructions for all surgical cases, you may be provided with more specific instructions according to your surgery.

## 2022-12-12 ENCOUNTER — ANESTHESIA EVENT (OUTPATIENT)
Dept: OPERATING ROOM | Age: 37
End: 2022-12-12
Payer: MEDICAID

## 2022-12-13 ENCOUNTER — HOSPITAL ENCOUNTER (OUTPATIENT)
Age: 37
Setting detail: OUTPATIENT SURGERY
Discharge: HOME | End: 2022-12-13
Attending: SURGERY | Admitting: SURGERY
Payer: MEDICAID

## 2022-12-13 ENCOUNTER — ANESTHESIA (OUTPATIENT)
Dept: OPERATING ROOM | Age: 37
End: 2022-12-13
Payer: MEDICAID

## 2022-12-13 VITALS
RESPIRATION RATE: 17 BRPM | WEIGHT: 141 LBS | HEART RATE: 55 BPM | BODY MASS INDEX: 22.66 KG/M2 | TEMPERATURE: 97.2 F | HEIGHT: 66 IN | OXYGEN SATURATION: 98 % | DIASTOLIC BLOOD PRESSURE: 70 MMHG | SYSTOLIC BLOOD PRESSURE: 144 MMHG

## 2022-12-13 DIAGNOSIS — K61.0 PERIANAL ABSCESS: Primary | ICD-10-CM

## 2022-12-13 PROBLEM — K62.89 PERIANAL CYST: Status: ACTIVE | Noted: 2022-12-13

## 2022-12-13 PROCEDURE — 2580000003 HC RX 258: Performed by: SURGERY

## 2022-12-13 PROCEDURE — 2500000003 HC RX 250 WO HCPCS: Performed by: SURGERY

## 2022-12-13 PROCEDURE — 6360000002 HC RX W HCPCS: Performed by: NURSE ANESTHETIST, CERTIFIED REGISTERED

## 2022-12-13 PROCEDURE — 6360000002 HC RX W HCPCS: Performed by: SURGERY

## 2022-12-13 PROCEDURE — 3700000001 HC ADD 15 MINUTES (ANESTHESIA): Performed by: SURGERY

## 2022-12-13 PROCEDURE — 2500000003 HC RX 250 WO HCPCS: Performed by: NURSE ANESTHETIST, CERTIFIED REGISTERED

## 2022-12-13 PROCEDURE — C9290 INJ, BUPIVACAINE LIPOSOME: HCPCS | Performed by: SURGERY

## 2022-12-13 PROCEDURE — 7100000001 HC PACU RECOVERY - ADDTL 15 MIN: Performed by: SURGERY

## 2022-12-13 PROCEDURE — 3700000000 HC ANESTHESIA ATTENDED CARE: Performed by: SURGERY

## 2022-12-13 PROCEDURE — 46922 EXCISION OF ANAL LESION(S): CPT | Performed by: SURGERY

## 2022-12-13 PROCEDURE — 7100000011 HC PHASE II RECOVERY - ADDTL 15 MIN: Performed by: SURGERY

## 2022-12-13 PROCEDURE — 3600000012 HC SURGERY LEVEL 2 ADDTL 15MIN: Performed by: SURGERY

## 2022-12-13 PROCEDURE — 2709999900 HC NON-CHARGEABLE SUPPLY: Performed by: SURGERY

## 2022-12-13 PROCEDURE — 3600000002 HC SURGERY LEVEL 2 BASE: Performed by: SURGERY

## 2022-12-13 PROCEDURE — 2580000003 HC RX 258: Performed by: NURSE ANESTHETIST, CERTIFIED REGISTERED

## 2022-12-13 PROCEDURE — A4216 STERILE WATER/SALINE, 10 ML: HCPCS | Performed by: SURGERY

## 2022-12-13 PROCEDURE — 7100000010 HC PHASE II RECOVERY - FIRST 15 MIN: Performed by: SURGERY

## 2022-12-13 PROCEDURE — 88304 TISSUE EXAM BY PATHOLOGIST: CPT

## 2022-12-13 PROCEDURE — A4217 STERILE WATER/SALINE, 500 ML: HCPCS | Performed by: SURGERY

## 2022-12-13 PROCEDURE — 7100000000 HC PACU RECOVERY - FIRST 15 MIN: Performed by: SURGERY

## 2022-12-13 RX ORDER — SODIUM CHLORIDE 0.9 % (FLUSH) 0.9 %
5-40 SYRINGE (ML) INJECTION EVERY 12 HOURS SCHEDULED
Status: DISCONTINUED | OUTPATIENT
Start: 2022-12-13 | End: 2022-12-13 | Stop reason: HOSPADM

## 2022-12-13 RX ORDER — OXYCODONE HYDROCHLORIDE 5 MG/1
5 TABLET ORAL EVERY 6 HOURS PRN
Qty: 15 TABLET | Refills: 0 | Status: SHIPPED | OUTPATIENT
Start: 2022-12-13 | End: 2022-12-18

## 2022-12-13 RX ORDER — HALOPERIDOL 5 MG/ML
1 INJECTION INTRAMUSCULAR
Status: DISCONTINUED | OUTPATIENT
Start: 2022-12-13 | End: 2022-12-13 | Stop reason: HOSPADM

## 2022-12-13 RX ORDER — ONDANSETRON 2 MG/ML
INJECTION INTRAMUSCULAR; INTRAVENOUS PRN
Status: DISCONTINUED | OUTPATIENT
Start: 2022-12-13 | End: 2022-12-13 | Stop reason: SDUPTHER

## 2022-12-13 RX ORDER — DIPHENHYDRAMINE HYDROCHLORIDE 50 MG/ML
12.5 INJECTION INTRAMUSCULAR; INTRAVENOUS
Status: DISCONTINUED | OUTPATIENT
Start: 2022-12-13 | End: 2022-12-13 | Stop reason: HOSPADM

## 2022-12-13 RX ORDER — MAGNESIUM HYDROXIDE 1200 MG/15ML
LIQUID ORAL CONTINUOUS PRN
Status: COMPLETED | OUTPATIENT
Start: 2022-12-13 | End: 2022-12-13

## 2022-12-13 RX ORDER — IBUPROFEN 600 MG/1
600 TABLET ORAL 3 TIMES DAILY PRN
Qty: 90 TABLET | Refills: 0 | Status: SHIPPED | OUTPATIENT
Start: 2022-12-13

## 2022-12-13 RX ORDER — LIDOCAINE HYDROCHLORIDE 20 MG/ML
INJECTION, SOLUTION EPIDURAL; INFILTRATION; INTRACAUDAL; PERINEURAL PRN
Status: DISCONTINUED | OUTPATIENT
Start: 2022-12-13 | End: 2022-12-13 | Stop reason: SDUPTHER

## 2022-12-13 RX ORDER — ACETAMINOPHEN 500 MG
1000 TABLET ORAL 3 TIMES DAILY PRN
Qty: 120 TABLET | Refills: 1 | Status: SHIPPED | OUTPATIENT
Start: 2022-12-13

## 2022-12-13 RX ORDER — METRONIDAZOLE 500 MG/100ML
500 INJECTION, SOLUTION INTRAVENOUS ONCE
Status: COMPLETED | OUTPATIENT
Start: 2022-12-13 | End: 2022-12-13

## 2022-12-13 RX ORDER — LORAZEPAM 2 MG/ML
0.5 INJECTION INTRAMUSCULAR
Status: DISCONTINUED | OUTPATIENT
Start: 2022-12-13 | End: 2022-12-13 | Stop reason: HOSPADM

## 2022-12-13 RX ORDER — SODIUM CHLORIDE 9 MG/ML
INJECTION, SOLUTION INTRAVENOUS PRN
Status: DISCONTINUED | OUTPATIENT
Start: 2022-12-13 | End: 2022-12-13 | Stop reason: HOSPADM

## 2022-12-13 RX ORDER — LIDOCAINE 50 MG/G
OINTMENT TOPICAL
Qty: 2 EACH | Refills: 1 | Status: SHIPPED | OUTPATIENT
Start: 2022-12-13

## 2022-12-13 RX ORDER — DOCUSATE SODIUM 100 MG/1
100 CAPSULE, LIQUID FILLED ORAL 2 TIMES DAILY PRN
Qty: 60 CAPSULE | Refills: 0 | Status: SHIPPED | OUTPATIENT
Start: 2022-12-13

## 2022-12-13 RX ORDER — SODIUM CHLORIDE 0.9 % (FLUSH) 0.9 %
5-40 SYRINGE (ML) INJECTION PRN
Status: DISCONTINUED | OUTPATIENT
Start: 2022-12-13 | End: 2022-12-13 | Stop reason: HOSPADM

## 2022-12-13 RX ORDER — FENTANYL CITRATE 50 UG/ML
INJECTION, SOLUTION INTRAMUSCULAR; INTRAVENOUS PRN
Status: DISCONTINUED | OUTPATIENT
Start: 2022-12-13 | End: 2022-12-13 | Stop reason: SDUPTHER

## 2022-12-13 RX ORDER — ROCURONIUM BROMIDE 10 MG/ML
INJECTION, SOLUTION INTRAVENOUS PRN
Status: DISCONTINUED | OUTPATIENT
Start: 2022-12-13 | End: 2022-12-13 | Stop reason: SDUPTHER

## 2022-12-13 RX ORDER — ONDANSETRON 2 MG/ML
4 INJECTION INTRAMUSCULAR; INTRAVENOUS
Status: DISCONTINUED | OUTPATIENT
Start: 2022-12-13 | End: 2022-12-13 | Stop reason: HOSPADM

## 2022-12-13 RX ORDER — MEPERIDINE HYDROCHLORIDE 25 MG/ML
12.5 INJECTION INTRAMUSCULAR; INTRAVENOUS; SUBCUTANEOUS
Status: DISCONTINUED | OUTPATIENT
Start: 2022-12-13 | End: 2022-12-13 | Stop reason: HOSPADM

## 2022-12-13 RX ORDER — FENTANYL CITRATE 50 UG/ML
50 INJECTION, SOLUTION INTRAMUSCULAR; INTRAVENOUS EVERY 5 MIN PRN
Status: DISCONTINUED | OUTPATIENT
Start: 2022-12-13 | End: 2022-12-13 | Stop reason: HOSPADM

## 2022-12-13 RX ORDER — MIDAZOLAM HYDROCHLORIDE 1 MG/ML
INJECTION INTRAMUSCULAR; INTRAVENOUS PRN
Status: DISCONTINUED | OUTPATIENT
Start: 2022-12-13 | End: 2022-12-13 | Stop reason: SDUPTHER

## 2022-12-13 RX ORDER — OXYCODONE HYDROCHLORIDE 5 MG/1
5 TABLET ORAL
Status: DISCONTINUED | OUTPATIENT
Start: 2022-12-13 | End: 2022-12-13 | Stop reason: HOSPADM

## 2022-12-13 RX ORDER — SODIUM CHLORIDE 9 MG/ML
INJECTION, SOLUTION INTRAVENOUS CONTINUOUS PRN
Status: DISCONTINUED | OUTPATIENT
Start: 2022-12-13 | End: 2022-12-13 | Stop reason: SDUPTHER

## 2022-12-13 RX ORDER — SUCCINYLCHOLINE/SOD CL,ISO/PF 200MG/10ML
SYRINGE (ML) INTRAVENOUS PRN
Status: DISCONTINUED | OUTPATIENT
Start: 2022-12-13 | End: 2022-12-13 | Stop reason: SDUPTHER

## 2022-12-13 RX ORDER — DEXAMETHASONE SODIUM PHOSPHATE 4 MG/ML
INJECTION, SOLUTION INTRA-ARTICULAR; INTRALESIONAL; INTRAMUSCULAR; INTRAVENOUS; SOFT TISSUE PRN
Status: DISCONTINUED | OUTPATIENT
Start: 2022-12-13 | End: 2022-12-13 | Stop reason: SDUPTHER

## 2022-12-13 RX ORDER — PROPOFOL 10 MG/ML
INJECTION, EMULSION INTRAVENOUS PRN
Status: DISCONTINUED | OUTPATIENT
Start: 2022-12-13 | End: 2022-12-13 | Stop reason: SDUPTHER

## 2022-12-13 RX ADMIN — CEFAZOLIN 2000 MG: 2 INJECTION, POWDER, FOR SOLUTION INTRAMUSCULAR; INTRAVENOUS at 07:44

## 2022-12-13 RX ADMIN — MIDAZOLAM 2 MG: 1 INJECTION INTRAMUSCULAR; INTRAVENOUS at 07:30

## 2022-12-13 RX ADMIN — ROCURONIUM BROMIDE 10 MG: 10 INJECTION INTRAVENOUS at 07:35

## 2022-12-13 RX ADMIN — DEXAMETHASONE SODIUM PHOSPHATE 8 MG: 4 INJECTION, SOLUTION INTRAMUSCULAR; INTRAVENOUS at 07:55

## 2022-12-13 RX ADMIN — METRONIDAZOLE 500 MG: 500 INJECTION, SOLUTION INTRAVENOUS at 06:51

## 2022-12-13 RX ADMIN — FENTANYL CITRATE 100 MCG: 50 INJECTION INTRAMUSCULAR; INTRAVENOUS at 07:33

## 2022-12-13 RX ADMIN — SODIUM CHLORIDE: 9 INJECTION, SOLUTION INTRAVENOUS at 07:28

## 2022-12-13 RX ADMIN — HYDROMORPHONE HYDROCHLORIDE 1 MG: 1 INJECTION, SOLUTION INTRAMUSCULAR; INTRAVENOUS; SUBCUTANEOUS at 08:18

## 2022-12-13 RX ADMIN — ROCURONIUM BROMIDE 30 MG: 10 INJECTION INTRAVENOUS at 07:40

## 2022-12-13 RX ADMIN — Medication 160 MG: at 07:36

## 2022-12-13 RX ADMIN — PROPOFOL 300 MG: 10 INJECTION, EMULSION INTRAVENOUS at 07:35

## 2022-12-13 RX ADMIN — LIDOCAINE HYDROCHLORIDE 60 MG: 20 INJECTION, SOLUTION EPIDURAL; INFILTRATION; INTRACAUDAL; PERINEURAL at 07:35

## 2022-12-13 RX ADMIN — ONDANSETRON 4 MG: 2 INJECTION INTRAMUSCULAR; INTRAVENOUS at 07:55

## 2022-12-13 RX ADMIN — SUGAMMADEX 200 MG: 100 INJECTION, SOLUTION INTRAVENOUS at 08:15

## 2022-12-13 ASSESSMENT — PAIN SCALES - GENERAL: PAINLEVEL_OUTOF10: 0

## 2022-12-13 ASSESSMENT — LIFESTYLE VARIABLES: SMOKING_STATUS: 1

## 2022-12-13 ASSESSMENT — ENCOUNTER SYMPTOMS: SHORTNESS OF BREATH: 0

## 2022-12-13 ASSESSMENT — PAIN DESCRIPTION - DESCRIPTORS: DESCRIPTORS: SORE

## 2022-12-13 ASSESSMENT — PAIN - FUNCTIONAL ASSESSMENT: PAIN_FUNCTIONAL_ASSESSMENT: 0-10

## 2022-12-13 NOTE — OP NOTE
Operative Report      Patient: Gustavo Vang MRN: [id number]     YOB: 1985  Age: 44 y.o. Sex: male        Primary Care Physician: No primary care provider on file. DATE OF OPERATION: 12/13/22    PREOPERATIVE DIAGNOSIS: perianal cyst    POSTOPERATIVE DIAGNOSIS: same    PROCEDURE PERFORMED: excision of perianal cyst measuring approximately 1.5 cm    SURGEON: Jazmyne Jackson MD    ANESTHESIA: General endotracheal    ASA CLASS: 2    DVT PROPHYLAXIS: bilateral SCDs    ANTIBIOTICS: ancef 2g IV preoperatively and flagyl 500 mg IV preoperatively    INDICATIONS: Gustavo Vang presented with a persistent perianal nodule with possible fistula. The risks, benefits, and alternatives were explained to the patient and he was willing to consent for the procedure. Procedure Details:  After informed consent was obtained, the patient  was brought to the operating room and placed in the supine position. General  anesthesia was then induced. He was then flipped over into the prone  jackknife position and prepped and draped in the usual sterile fashion. A  timeout was then performed. We then performed a rectal exam under anesthesia,  which showed no internal anorectal pathology. The palpable right anterior cyst was manipulated with no drainage into the rectum. There was no obvious opening and no mass. We then made an elliptical incision around the cyst down to the subcutaneous tissue. We slowly freed the cyst with a combination of sharp dissection and electrocautery. It abutted the edge of the external sphincter. Care was taken to hug the cyst and leave behind all muscle fibers. Once removed, the cyst measured 1.5 cm. It was sent to pathology for further evaluation. The wound was inspected and electrocautery was used for hemostasis.   There was no obvious fistula tract extending from the cyst.  3-0 vicryl sutures were used to close the wound with deep dermal sutures along with deeper sutures to close some of the dead space. A corner of the incision was left open to allow for drainage. Dermabond was placed along two sides of the v-shaped incision with the angle of the V open. Exparel was then injected in four  quadrants around the anus. The patient tolerated the procedure well and was  taken to the PACU in stable condition. The exam after the case showed a widely patent anal canal with no evidence of  narrowing.       FINDINGS: 1.5 cm right anterior perianal cyst    Estimated Blood Loss: Minimal    Complications: none           Specimen: perianal cyst                  Electronically signed by Kailyn Tan MD on 12/13/2022 at 8:20 AM

## 2022-12-13 NOTE — ANESTHESIA PRE PROCEDURE
Department of Anesthesiology  Preprocedure Note       Name:  Raleigh Kawasaki   Age:  44 y.o.  :  1985                                          MRN:  [id number]         Date:  2022      Surgeon: Jaime Wright):  Leslee Bonner MD    Procedure: Procedure(s):  RECTAL EXAM UNDER ANESTHESIA WITH POSSIBLE FISTULOTOMY, POSSIBLE EXCISION OF PERIANAL CYST    Medications prior to admission:   Prior to Admission medications    Medication Sig Start Date End Date Taking? Authorizing Provider   terbinafine (LAMISIL) 1 % cream Apply topically 2 times daily. 10/26/22   KATLYN Bess - CNP       Current medications:    Current Facility-Administered Medications   Medication Dose Route Frequency Provider Last Rate Last Admin    ceFAZolin (ANCEF) 2,000 mg in dextrose 5 % 50 mL IVPB (mini-bag)  2,000 mg IntraVENous On Call to [address] MD        metronidazole (FLAGYL) 500 mg in 0.9% NaCl 100 mL IVPB premix  500 mg IntraVENous Once Leslee Bonner  mL/hr at 22 0651 500 mg at 22 9328    sodium chloride flush 0.9 % injection 5-40 mL  5-40 mL IntraVENous 2 times per day Jess Ordonez MD        sodium chloride flush 0.9 % injection 5-40 mL  5-40 mL IntraVENous PRN Jess Ordonez MD        0.9 % sodium chloride infusion   IntraVENous PRN Jess Ordonez MD           Allergies:  No Known Allergies    Problem List:    Patient Active Problem List   Diagnosis Code    Perianal abscess K61.0       Past Medical History:        Diagnosis Date    Rectal cyst 2022       Past Surgical History:  History reviewed. No pertinent surgical history.     Social History:    Social History     Tobacco Use    Smoking status: Every Day     Packs/day: 0.50     Types: Cigarettes    Smokeless tobacco: Never   Substance Use Topics    Alcohol use: Not Currently     Comment: not for 1.5 years                                Ready to quit: Yes  Counseling given: Yes      Vital Signs (Current):   Vitals: 12/05/22 1555 12/13/22 0617   BP:  127/72   Pulse:  63   Resp:  14   Temp:  97.3 °F (36.3 °C)   TempSrc:  Temporal   SpO2:  97%   Weight: 141 lb (64 kg)    Height: 5' 6\" (1.676 m)                                               BP Readings from Last 3 Encounters:   12/13/22 127/72   11/07/22 111/75   10/26/22 105/67       NPO Status: Time of last liquid consumption: 2000                        Time of last solid consumption: 2000                        Date of last liquid consumption: 12/12/22                        Date of last solid food consumption: 12/12/22    BMI:   Wt Readings from Last 3 Encounters:   12/05/22 141 lb (64 kg)   11/07/22 142 lb (64.4 kg)   10/26/22 138 lb 7.2 oz (62.8 kg)     Body mass index is 22.76 kg/m². CBC:   Lab Results   Component Value Date/Time    WBC 9.6 05/18/2022 07:10 AM    RBC 4.65 05/18/2022 07:10 AM    HGB 14.7 05/18/2022 07:10 AM    HCT 43.0 05/18/2022 07:10 AM    MCV 92.3 05/18/2022 07:10 AM    RDW 12.9 05/18/2022 07:10 AM     05/18/2022 07:10 AM       CMP:   Lab Results   Component Value Date/Time     05/18/2022 07:10 AM    K 4.6 05/18/2022 07:10 AM     05/18/2022 07:10 AM    CO2 23 05/18/2022 07:10 AM    BUN 8 05/18/2022 07:10 AM    CREATININE 0.7 05/18/2022 07:10 AM    GFRAA >60 05/18/2022 07:10 AM    LABGLOM >60 05/18/2022 07:10 AM    GLUCOSE 118 05/18/2022 07:10 AM    CALCIUM 9.0 05/18/2022 07:10 AM       POC Tests: No results for input(s): POCGLU, POCNA, POCK, POCCL, POCBUN, POCHEMO, POCHCT in the last 72 hours.     Coags: No results found for: PROTIME, INR, APTT    HCG (If Applicable): No results found for: PREGTESTUR, PREGSERUM, HCG, HCGQUANT     ABGs: No results found for: PHART, PO2ART, SJP7XCD, KPC5SSG, BEART, Q0EYCXZJ     Type & Screen (If Applicable):  No results found for: LABABO, LABRH    Drug/Infectious Status (If Applicable):  No results found for: HIV, HEPCAB    COVID-19 Screening (If Applicable): No results found for: COVID19        Anesthesia Evaluation  Patient summary reviewed no history of anesthetic complications:   Airway: Mallampati: I  TM distance: >3 FB   Neck ROM: full  Mouth opening: > = 3 FB   Dental: normal exam         Pulmonary:   (+) current smoker (0.75 packs daily)    (-) shortness of breath          Patient did not smoke on day of surgery. Cardiovascular:Negative CV ROS        (-) pacemaker, past MI, CABG/stent and  angina       Beta Blocker:  Not on Beta Blocker         Neuro/Psych:   Negative Neuro/Psych ROS     (-) seizures and CVA           GI/Hepatic/Renal: Neg GI/Hepatic/Renal ROS       (-) liver disease and no renal disease       Endo/Other: Negative Endo/Other ROS       (-) diabetes mellitus, hypothyroidism, hyperthyroidism               Abdominal:             Vascular: negative vascular ROS. Other Findings:           Anesthesia Plan      general     ASA 2       Induction: intravenous. MIPS: Postoperative opioids intended and Prophylactic antiemetics administered. Anesthetic plan and risks discussed with patient. Plan discussed with CRNA. This pre-anesthesia assessment may be used as a history and physical.    DOS STAFF ADDENDUM:    Pt seen and examined, chart reviewed (including anesthesia, drug and allergy history). No interval changes to history and physical examination. Anesthetic plan, risks, benefits, alternatives, and personnel involved discussed with patient. Patient verbalized an understanding and agrees to proceed.       Alicia Espinal MD  December 13, 2022  7:06 AM

## 2022-12-13 NOTE — DISCHARGE INSTRUCTIONS
Johnson Tinsley MD     General and Vascular Surgery   Quinton Chery MD     [address] [facility] [address]     [address] [address], [place] [address]  KATLYN Johnson CNP   Phone: [phone]           Fax: [phone]                                               POST-OPERATIVE INSTRUCTIONS FOR RECTAL EXAM UNDER ANESTHESIA    Call the office to schedule your post-operative appointment with your surgeon for three (3) weeks. You will have water occlusive glue closing your incision(s). You may shower. Wash incision(s) gently, and pat dry. Do not rub your incision(s). Do not soak incision(s) in tub baths, hot tubs or pools    General guidelines for activity: It is OK to be up walking around; walking up and down stairs is also OK. Do what is comfortable: stop and rest when you feel tired. Drink plenty of fluids and stay on a bland diet for 2-3 days after surgery. Do NOT drive while taking your narcotic pain medicine. Watch for signs of infection:   Fever over 100.5°   Excessive warmth or bright redness around your incision(s)  Leakage of bloody or cloudy fluid from your incision(s)    You will have pain medicine ordered. Take as directed. If you experience constipation  Increase your water intake, and activity; walking is best.  A stool softener or mild laxative may be necessary if you still have not had a bowel  movement ; call the office for further instructions.

## 2022-12-13 NOTE — PROGRESS NOTES
[facility] to PACU. VSS. Oral airway still place. IV patent. Dressing clean, dry and intact. NVPS-0.    [address] Jo[facility]atham 's PACU recovery complete. VSS. A/Ox4. On room air. Dressing remains clean. Dry and intact per patient.

## 2022-12-13 NOTE — H&P
Update History & Physical    The patient's History and Physical from Dr. Alicia Espinal today, December 13, 2022 was reviewed with the patient and I examined the patient. There was no change. The surgical site was confirmed by the patient and me. Plan: The risks, benefits, expected outcome, and alternative to the recommended procedure have been discussed with the patient. Patient understands and wants to proceed with the procedure. Will proceed with rectal exam under anesthesia, excision of perianal nodule, possible fistulotomy. Ancef, flagyl ordered. Bilateral SCDs.     Electronically signed by Arpit Terry MD on 12/13/2022 at 7:26 AM

## 2022-12-13 NOTE — PROGRESS NOTES
Pt to Phase 2 from PACU, Pt is alert and oriented with stable vital signs and denies pain at this time

## 2022-12-13 NOTE — PROGRESS NOTES
Pt is alert and oriented and denies pain at this time. Medications delivered to bedside. Instructions gone over with Pt and father over the phone, all questions answered. Pt father Rodrigo Edi to transport Pt home.

## 2022-12-14 ENCOUNTER — TELEPHONE (OUTPATIENT)
Dept: SURGERY | Age: 37
End: 2022-12-14

## 2022-12-14 NOTE — TELEPHONE ENCOUNTER
Mr. Matteo Aadir had surgery 12/13 and needs a work note stating when he can return to work. Patient is aware Luis Carmona is out and we will have to check with Dr. Ike Spicer about his work return so will most likely not hear from anyone until tomorrow. E-mail letter to to [email or web address]

## 2022-12-14 NOTE — LETTER
[address] [facility]  [address]  [facility] [address]  Phone: [phone]  Fax: [phone]    Ariana Lewis MD        December 15, 2022     Patient: Tiana Ríos   YOB: 1985   Date of Visit: 12/14/2022       To Whom It May Concern:    Mr. Tiana Ríos is under my care for the following diagnosis:                   1.    K61.0  Perianal abscess. He had outpatient surgery on 12-13-22 at [facility].     It is my medical opinion that Tiana Ríos remain off work until 12-27-22. Please excuse his absence from work. If you have any questions or concerns, please don't hesitate to call.     Sincerely,        Ariana Lewis MD

## 2023-01-05 ENCOUNTER — OFFICE VISIT (OUTPATIENT)
Dept: SURGERY | Age: 38
End: 2023-01-05

## 2023-01-05 VITALS — HEART RATE: 81 BPM | SYSTOLIC BLOOD PRESSURE: 119 MMHG | DIASTOLIC BLOOD PRESSURE: 77 MMHG

## 2023-01-05 DIAGNOSIS — K62.89 PERIANAL CYST: Primary | ICD-10-CM

## 2023-01-05 PROCEDURE — 99024 POSTOP FOLLOW-UP VISIT: CPT | Performed by: SURGERY

## 2023-01-05 NOTE — PROGRESS NOTES
Post Operative Visit    [facility] - [facility]  [facility]   Matilde Reilly MD    [address]  [address] [facility], [place] [address]  Phone: [phone]  Fax: [phone]    Eloy Crawford   YOB: 1985    Date of Visit:  1/5/2023    No ref. provider found  No primary care provider on file. Subjective:     Eloy Crawford presents for a three week follow-up s/p excision of perianal cyst. Overall he has been doing well since his procedure. There has been complete resolution of his pain. His wound opened and has had minimal discharge. No bleeding. He denies any fevers or chills. No Known Allergies  No outpatient medications have been marked as taking for the 1/5/23 encounter (Appointment) with Myles Schwab MD.       There were no vitals filed for this visit. There is no height or weight on file to calculate BMI. Wt Readings from Last 3 Encounters:   12/05/22 141 lb (64 kg)   11/07/22 142 lb (64.4 kg)   10/26/22 138 lb 7.2 oz (62.8 kg)     BP Readings from Last 3 Encounters:   12/13/22 (!) 144/70   11/07/22 111/75   10/26/22 105/67          Objective:    CONSTITUTIONAL:  awake, alert, no apparent distress    LUNGS:  Resp easy and unlabored  MUSCULOSKELETAL: No edema  NEUROLOGIC:  Mental Status Exam:  Level of Alertness:   awake  Orientation:   person, place, time  SKIN: perianal wound with 3 mm of separation, no erythema or induration, no drainage      Pathology:  FINAL DIAGNOSIS:     Perianal nodule:   - Epidermal inclusion (infundibular) cyst.       JIAJA/TONO     ASSESSMENT:     Diagnosis Orders   1. Perianal cyst            PLAN:    Eloy Crawford is doing well s/p excision of perianal cyst.  Wound opened slightly but no sign of infection. Continue to cover while draining until healed. Will plan on having him follow up prn.       Electronically signed by Myles Schwab MD on 1/5/2023 at 9:47 AM

## (undated) DEVICE — SHEET, T, LAPAROTOMY, STERILE: Brand: MEDLINE

## (undated) DEVICE — PAD,ABDOMINAL,8"X7.5",STERILE,LF,1/PK: Brand: MEDLINE

## (undated) DEVICE — UNDERPAD INCONT XL MESH PROTCT + DISP FOR MAT USE

## (undated) DEVICE — INTENDED FOR TISSUE SEPARATION, AND OTHER PROCEDURES THAT REQUIRE A SHARP SURGICAL BLADE TO PUNCTURE OR CUT.: Brand: BARD-PARKER ® STAINLESS STEEL BLADES

## (undated) DEVICE — GLOVE ORANGE PI 7 1/2   MSG9075

## (undated) DEVICE — SUTURE VCRL + SZ 3-0 L27IN ABSRB UD L26MM SH 1/2 CIR VCP416H

## (undated) DEVICE — GOWN,AURORA,NONREINF,RAGLAN,XXL,STERILE: Brand: MEDLINE

## (undated) DEVICE — SPONGE GZ W4XL4IN COT 12 PLY TYP VII WVN C FLD DSGN

## (undated) DEVICE — SOLUTION PREP POVIDONE IOD FOR SKIN MUCOUS MEM PRIOR TO

## (undated) DEVICE — GLOVE SURG SZ 8 L12IN FNGR THK79MIL GRN LTX FREE

## (undated) DEVICE — PREMIUM DRY TRAY LF: Brand: MEDLINE INDUSTRIES, INC.

## (undated) DEVICE — NEEDLE HYPO 25GA L1.5IN BVL ORIENTED ECLIPSE

## (undated) DEVICE — MINOR SET UP: Brand: MEDLINE INDUSTRIES, INC.

## (undated) DEVICE — CANISTER, RIGID, 1200CC: Brand: MEDLINE INDUSTRIES, INC.

## (undated) DEVICE — SOLUTION IV IRRIG POUR BRL 0.9% SODIUM CHL 2F7124